# Patient Record
Sex: MALE | Race: WHITE | NOT HISPANIC OR LATINO | Employment: FULL TIME | URBAN - METROPOLITAN AREA
[De-identification: names, ages, dates, MRNs, and addresses within clinical notes are randomized per-mention and may not be internally consistent; named-entity substitution may affect disease eponyms.]

---

## 2017-02-18 DIAGNOSIS — I10 ESSENTIAL HYPERTENSION, BENIGN: Chronic | ICD-10-CM

## 2017-02-18 RX ORDER — METOPROLOL TARTRATE 25 MG/1
TABLET, FILM COATED ORAL
Qty: 30 TABLET | Refills: 2 | Status: SHIPPED | OUTPATIENT
Start: 2017-02-18 | End: 2017-05-29 | Stop reason: SDUPTHER

## 2017-04-30 ENCOUNTER — HOSPITAL ENCOUNTER (EMERGENCY)
Facility: HOSPITAL | Age: 62
Discharge: HOME | End: 2017-05-01
Attending: EMERGENCY MEDICINE | Admitting: EMERGENCY MEDICINE
Payer: COMMERCIAL

## 2017-04-30 VITALS
RESPIRATION RATE: 18 BRPM | HEIGHT: 69 IN | HEART RATE: 79 BPM | TEMPERATURE: 98 F | BODY MASS INDEX: 32.58 KG/M2 | WEIGHT: 220 LBS | DIASTOLIC BLOOD PRESSURE: 69 MMHG | OXYGEN SATURATION: 97 % | SYSTOLIC BLOOD PRESSURE: 131 MMHG

## 2017-04-30 DIAGNOSIS — S90.31XA CONTUSION OF HEEL, RIGHT, INITIAL ENCOUNTER: Primary | ICD-10-CM

## 2017-04-30 DIAGNOSIS — R52 PAIN: ICD-10-CM

## 2017-04-30 PROCEDURE — 99283 EMERGENCY DEPT VISIT LOW MDM: CPT

## 2017-04-30 PROCEDURE — 99283 EMERGENCY DEPT VISIT LOW MDM: CPT | Mod: ,,, | Performed by: EMERGENCY MEDICINE

## 2017-05-01 NOTE — DISCHARGE INSTRUCTIONS
Follow up with your doctor in 1-2 weeks.  Take tylenol if needed for pain  Return to ED for worsening pain or any other concerns.      Foot Contusion  You have a contusion. This is also called a bruise. There is swelling and some bleeding under the skin, but no broken bones. This injury generally takes a few days to a few weeks to heal.  During that time, the bruise will typically change in color from reddish, to purple-blue, to greenish-yellow, then to yellow-brown.  Home care  · Elevate the foot to reduce pain and swelling. As much as possible, sit or lie down with the foot raised about the level of your heart. This is especially important during the first 48 hours.  · Ice the foot to help reduce pain and swelling. Wrap a cold source (ice pack or ice cubes in a plastic bag) in a thin towel. Apply to the bruised area for 20 minutes every 1 to 2 hours the first day. Continue this 3 to 4 times a day until the pain and swelling goes away.  · Unless another medication was prescribed, you can take acetaminophen, ibuprofen, or naproxen to control pain. (If you have chronic liver or kidney disease or ever had a stomach ulcer or GI bleeding, talk with your doctor before using these medicines.)  Follow up  Follow up with your health care provider or our staff as advised. Call if you are not improving within 1 to 2 weeks.  When to seek medical advice   Call your health care provider right away if you have any of the following:  · Increased pain or swelling  · Foot or leg becomes cold, blue, numb or tingly  · Signs of infection: Warmth, drainage, or increased redness or pain around the bruise  · Inability to move the injured foot   · Frequent bruising for unknown reasons  Date Last Reviewed: 4/29/2015  © 5442-1573 [employer]. [address] [place] [address], [place] [address]. All rights reserved. This information is not intended as a substitute for professional medical care. Always follow your healthcare professional's  instructions.

## 2017-05-01 NOTE — ED PROVIDER NOTES
"Encounter Date: 4/30/2017       History     Chief Complaint   Patient presents with    Foot Injury     Right foot pain. Pt states he tripped and "twisted my heel."      Review of patient's allergies indicates:  No Known Allergies  HPI Comments: Pt seen at 11:50 PM    63 y/o M states he mistepped and tripped and landed on his rt foot. States he thinks he twisted his heel but cannot provide more details on how he landed on hid foot.  Did not fall to ground. No head trauma. No LOC.  No other complaint of pain.  The history is provided by the patient.     Past Medical History:   Diagnosis Date    Back pain     Hyperglycemia     Hyperlipidemia     Hypertension     Myocardial infarction     Nuclear sclerosis - Both Eyes 1/10/2013    Polyp of colon     Tennis elbow 8/6/2014     Past Surgical History:   Procedure Laterality Date    CATARACT EXTRACTION Left 6/26/2015    Dr. Larsen    CORONARY ANGIOPLASTY WITH STENT PLACEMENT       Family History   Problem Relation Age of Onset    Cataracts Sister     Amblyopia Neg Hx     Blindness Neg Hx     Glaucoma Neg Hx     Macular degeneration Neg Hx     Retinal detachment Neg Hx     Strabismus Neg Hx      Social History   Substance Use Topics    Smoking status: Never Smoker    Smokeless tobacco: None    Alcohol use No     Review of Systems   Constitutional: Negative for fever.   Eyes: Negative for visual disturbance.   Respiratory: Negative for chest tightness.    Cardiovascular: Negative for chest pain.   Gastrointestinal: Negative for abdominal pain.   Musculoskeletal: Negative for back pain and neck pain.   Skin: Negative for rash.   Neurological: Negative for dizziness and headaches.       Physical Exam   Initial Vitals   BP Pulse Resp Temp SpO2   04/30/17 2204 04/30/17 2204 04/30/17 2204 04/30/17 2204 04/30/17 2204   131/69 79 18 98.1 °F (36.7 °C) 97 %     Physical Exam    Nursing note and vitals reviewed.  Constitutional: He appears well-developed and " well-nourished.   HENT:   Head: Normocephalic and atraumatic.   Neck: Normal range of motion. Neck supple.   Musculoskeletal: Normal range of motion. He exhibits no edema.   Rt ankle no ttp media land lateral malleolus, no ttp over achilles tendon.  No swelling or ecchymosis  Rt heel with ttp, no ecchymosis or swelling  No ttp over plantar fascia   Neurological: He is alert and oriented to person, place, and time.   Skin: Skin is warm and dry.         ED Course   Procedures  Labs Reviewed - No data to display       X-Rays:   Independently Interpreted Readings:   Other Readings:  XR Rt ankle no acute fracture    Medical Decision Making:   Initial Assessment:   61 y/o M mechanical fall with trauma to Rt heel  No suspision of achilles tendon injury  No suspision of calcaneus fracture  XR to rule out ankle fracture eval calcaneus  Anticipate discharge home              Attending Attestation:             Attending ED Notes:   XR negative for acute fracture  Will discharge home with RICE instructions and follow up with PCP          ED Course     Clinical Impression:   The primary encounter diagnosis was Contusion of heel, right, initial encounter. A diagnosis of Pain was also pertinent to this visit.    Disposition:   Disposition: Discharged  Condition: Stable       Vangie Whitley MD  05/04/17 0731

## 2017-05-29 DIAGNOSIS — I10 ESSENTIAL HYPERTENSION, BENIGN: Chronic | ICD-10-CM

## 2017-05-29 RX ORDER — METOPROLOL TARTRATE 25 MG/1
TABLET, FILM COATED ORAL
Qty: 30 TABLET | Refills: 2 | Status: SHIPPED | OUTPATIENT
Start: 2017-05-29 | End: 2017-08-22 | Stop reason: SDUPTHER

## 2017-06-21 ENCOUNTER — OFFICE VISIT (OUTPATIENT)
Dept: FAMILY MEDICINE | Facility: CLINIC | Age: 62
End: 2017-06-21
Payer: COMMERCIAL

## 2017-06-21 ENCOUNTER — LAB VISIT (OUTPATIENT)
Dept: LAB | Facility: HOSPITAL | Age: 62
End: 2017-06-21
Attending: FAMILY MEDICINE
Payer: COMMERCIAL

## 2017-06-21 VITALS
HEIGHT: 69 IN | HEART RATE: 58 BPM | TEMPERATURE: 98 F | BODY MASS INDEX: 32.75 KG/M2 | OXYGEN SATURATION: 97 % | WEIGHT: 221.13 LBS | SYSTOLIC BLOOD PRESSURE: 118 MMHG | DIASTOLIC BLOOD PRESSURE: 76 MMHG

## 2017-06-21 DIAGNOSIS — I20.89 STABLE ANGINA: ICD-10-CM

## 2017-06-21 DIAGNOSIS — E78.5 HYPERLIPIDEMIA, UNSPECIFIED HYPERLIPIDEMIA TYPE: Chronic | ICD-10-CM

## 2017-06-21 DIAGNOSIS — N18.30 HYPERTENSIVE KIDNEY DISEASE WITH CHRONIC KIDNEY DISEASE STAGE III: Primary | ICD-10-CM

## 2017-06-21 DIAGNOSIS — I12.9 HYPERTENSIVE KIDNEY DISEASE WITH CHRONIC KIDNEY DISEASE STAGE III: Primary | ICD-10-CM

## 2017-06-21 DIAGNOSIS — I25.119 CORONARY ARTERY DISEASE INVOLVING NATIVE CORONARY ARTERY WITH ANGINA PECTORIS, UNSPECIFIED WHETHER NATIVE OR TRANSPLANTED HEART: Chronic | ICD-10-CM

## 2017-06-21 DIAGNOSIS — I25.2 OLD MI (MYOCARDIAL INFARCTION): ICD-10-CM

## 2017-06-21 DIAGNOSIS — Z23 NEED FOR DIPHTHERIA-TETANUS-PERTUSSIS (TDAP) VACCINE: ICD-10-CM

## 2017-06-21 LAB
ALBUMIN SERPL BCP-MCNC: 4.1 G/DL
ALP SERPL-CCNC: 74 U/L
ALT SERPL W/O P-5'-P-CCNC: 20 U/L
ANION GAP SERPL CALC-SCNC: 6 MMOL/L
AST SERPL-CCNC: 21 U/L
BILIRUB SERPL-MCNC: 1 MG/DL
BUN SERPL-MCNC: 27 MG/DL
CALCIUM SERPL-MCNC: 9.9 MG/DL
CHLORIDE SERPL-SCNC: 109 MMOL/L
CHOLEST/HDLC SERPL: 2.9 {RATIO}
CO2 SERPL-SCNC: 25 MMOL/L
CREAT SERPL-MCNC: 1.5 MG/DL
EST. GFR  (AFRICAN AMERICAN): 56.1 ML/MIN/1.73 M^2
EST. GFR  (NON AFRICAN AMERICAN): 48.5 ML/MIN/1.73 M^2
GLUCOSE SERPL-MCNC: 91 MG/DL
HDL/CHOLESTEROL RATIO: 33.9 %
HDLC SERPL-MCNC: 109 MG/DL
HDLC SERPL-MCNC: 37 MG/DL
LDLC SERPL CALC-MCNC: 59.2 MG/DL
NONHDLC SERPL-MCNC: 72 MG/DL
POTASSIUM SERPL-SCNC: 5.2 MMOL/L
PROT SERPL-MCNC: 7.6 G/DL
SODIUM SERPL-SCNC: 140 MMOL/L
TRIGL SERPL-MCNC: 64 MG/DL

## 2017-06-21 PROCEDURE — 80061 LIPID PANEL: CPT

## 2017-06-21 PROCEDURE — 80053 COMPREHEN METABOLIC PANEL: CPT

## 2017-06-21 PROCEDURE — 99214 OFFICE O/P EST MOD 30 MIN: CPT | Mod: S$GLB,,, | Performed by: FAMILY MEDICINE

## 2017-06-21 PROCEDURE — 99999 PR PBB SHADOW E&M-EST. PATIENT-LVL III: CPT | Mod: PBBFAC,,, | Performed by: FAMILY MEDICINE

## 2017-06-21 PROCEDURE — 36415 COLL VENOUS BLD VENIPUNCTURE: CPT | Mod: PO

## 2017-06-21 NOTE — PROGRESS NOTES
Subjective:       Patient ID: Pratik Laird is a 62 y.o. male.    Chief Complaint: Hypertension (6 mo f/u); Hyperlipidemia; and Chronic Kidney Disease  63 yo male with HTN, hyperlipidemia and CAD presents for f/u of his chronic conditions. Pt is followed by Dr. Mace for CAD.  Hypertension   This is a chronic problem. The current episode started more than 1 year ago. The problem is unchanged. The problem is controlled. Pertinent negatives include no chest pain, orthopnea, palpitations, peripheral edema, PND or shortness of breath. Past treatments include beta blockers and ACE inhibitors. The current treatment provides significant improvement. There are no compliance problems.  Hypertensive end-organ damage includes CAD/MI. Identifiable causes of hypertension include chronic renal disease.   Hyperlipidemia   This is a chronic problem. The current episode started more than 1 year ago. The problem is controlled. Recent lipid tests were reviewed and are normal. Exacerbating diseases include chronic renal disease and obesity. He has no history of diabetes, hypothyroidism, liver disease or nephrotic syndrome. Pertinent negatives include no chest pain, focal sensory loss, focal weakness, leg pain, myalgias or shortness of breath. Current antihyperlipidemic treatment includes statins.     Review of Systems   Constitutional: Negative for chills and fever.   Eyes: Negative for visual disturbance.   Respiratory: Negative for shortness of breath.    Cardiovascular: Negative for chest pain, palpitations, orthopnea, leg swelling and PND.   Gastrointestinal: Negative for abdominal pain, anal bleeding, blood in stool, constipation, diarrhea, nausea and vomiting.   Genitourinary: Negative for dysuria and hematuria.   Musculoskeletal: Negative for myalgias.   Neurological: Negative for focal weakness.   Psychiatric/Behavioral: Negative for dysphoric mood and sleep disturbance. The patient is not nervous/anxious.         Objective:      Physical Exam   Constitutional: He is oriented to person, place, and time. He appears well-developed and well-nourished.   HENT:   Head: Normocephalic and atraumatic.   Neck: Normal range of motion. Neck supple. No JVD present. No thyromegaly present.   Cardiovascular: Normal rate, regular rhythm, normal heart sounds and intact distal pulses.    Pulmonary/Chest: Effort normal and breath sounds normal. He has no wheezes. He has no rales.   Abdominal: Soft. Bowel sounds are normal. He exhibits no mass. There is no tenderness.   Lymphadenopathy:     He has no cervical adenopathy.   Neurological: He is alert and oriented to person, place, and time.   Skin: Skin is warm and dry.   Vitals reviewed.      Assessment:     See plan      Plan:       Pratik was seen today for hypertension, hyperlipidemia and chronic kidney disease.    Diagnoses and all orders for this visit:    Hypertensive kidney disease with chronic kidney disease stage III: Controlled    Hyperlipidemia, unspecified hyperlipidemia type: Controlled  -     Comprehensive metabolic panel; Future  -     Lipid panel; Future    Coronary artery disease involving native coronary artery with angina pectoris, unspecified whether native or transplanted heart: Stable  - F/U with Dr. Mace    Stable angina    Old MI (myocardial infarction)    BMI 32.0-32.9,adult  Weight loss advised. Dietary and exercise counseling done.    Need for diphtheria-tetanus-pertussis (Tdap) vaccine  -     (In Office Administered) Tdap Vaccine (deferred)    F/U in 6 months.

## 2017-06-28 ENCOUNTER — CLINICAL SUPPORT (OUTPATIENT)
Dept: FAMILY MEDICINE | Facility: CLINIC | Age: 62
End: 2017-06-28
Payer: COMMERCIAL

## 2017-06-28 PROCEDURE — 90715 TDAP VACCINE 7 YRS/> IM: CPT | Mod: S$GLB,,, | Performed by: INTERNAL MEDICINE

## 2017-06-28 PROCEDURE — 90471 IMMUNIZATION ADMIN: CPT | Mod: S$GLB,,, | Performed by: INTERNAL MEDICINE

## 2017-06-30 ENCOUNTER — TELEPHONE (OUTPATIENT)
Dept: FAMILY MEDICINE | Facility: CLINIC | Age: 62
End: 2017-06-30

## 2017-06-30 DIAGNOSIS — I12.9 HYPERTENSIVE KIDNEY DISEASE WITH CHRONIC KIDNEY DISEASE STAGE III: Primary | ICD-10-CM

## 2017-06-30 DIAGNOSIS — N18.30 HYPERTENSIVE KIDNEY DISEASE WITH CHRONIC KIDNEY DISEASE STAGE III: Primary | ICD-10-CM

## 2017-06-30 NOTE — TELEPHONE ENCOUNTER
Please call pt regarding labs done 6/21/17. Kidney function has decreased. Please advise pt to increase his water intake and recheck kidney function in 1 week.    Cholesterol is controlled.

## 2017-07-03 NOTE — TELEPHONE ENCOUNTER
Spoke with patient about lab result, patient will be here on 07/10/17 to re-draw lab.  Patient verbalized understanding.

## 2017-07-10 ENCOUNTER — LAB VISIT (OUTPATIENT)
Dept: LAB | Facility: HOSPITAL | Age: 62
End: 2017-07-10
Attending: FAMILY MEDICINE
Payer: COMMERCIAL

## 2017-07-10 DIAGNOSIS — I12.9 HYPERTENSIVE KIDNEY DISEASE WITH CHRONIC KIDNEY DISEASE STAGE III: ICD-10-CM

## 2017-07-10 DIAGNOSIS — N18.30 HYPERTENSIVE KIDNEY DISEASE WITH CHRONIC KIDNEY DISEASE STAGE III: ICD-10-CM

## 2017-07-10 LAB
ALBUMIN SERPL BCP-MCNC: 3.9 G/DL
ANION GAP SERPL CALC-SCNC: 8 MMOL/L
BUN SERPL-MCNC: 20 MG/DL
CALCIUM SERPL-MCNC: 9.5 MG/DL
CHLORIDE SERPL-SCNC: 106 MMOL/L
CO2 SERPL-SCNC: 25 MMOL/L
CREAT SERPL-MCNC: 1.5 MG/DL
EST. GFR  (AFRICAN AMERICAN): 56.1 ML/MIN/1.73 M^2
EST. GFR  (NON AFRICAN AMERICAN): 48.5 ML/MIN/1.73 M^2
GLUCOSE SERPL-MCNC: 78 MG/DL
PHOSPHATE SERPL-MCNC: 3.2 MG/DL
POTASSIUM SERPL-SCNC: 4.8 MMOL/L
SODIUM SERPL-SCNC: 139 MMOL/L

## 2017-07-10 PROCEDURE — 80069 RENAL FUNCTION PANEL: CPT

## 2017-07-10 PROCEDURE — 36415 COLL VENOUS BLD VENIPUNCTURE: CPT | Mod: PO

## 2017-07-16 ENCOUNTER — TELEPHONE (OUTPATIENT)
Dept: FAMILY MEDICINE | Facility: CLINIC | Age: 62
End: 2017-07-16

## 2017-07-16 DIAGNOSIS — N18.30 HYPERTENSIVE KIDNEY DISEASE WITH CHRONIC KIDNEY DISEASE STAGE III: Primary | ICD-10-CM

## 2017-07-16 DIAGNOSIS — I12.9 HYPERTENSIVE KIDNEY DISEASE WITH CHRONIC KIDNEY DISEASE STAGE III: Primary | ICD-10-CM

## 2017-07-16 NOTE — TELEPHONE ENCOUNTER
Spoke with patient and scheduled appt with Nephrology on [address]. Patient request to mail appt.  Patient voices understanding.

## 2017-08-21 ENCOUNTER — OFFICE VISIT (OUTPATIENT)
Dept: NEPHROLOGY | Facility: CLINIC | Age: 62
End: 2017-08-21
Payer: COMMERCIAL

## 2017-08-21 VITALS
HEART RATE: 60 BPM | WEIGHT: 222.69 LBS | DIASTOLIC BLOOD PRESSURE: 60 MMHG | BODY MASS INDEX: 32.98 KG/M2 | HEIGHT: 69 IN | OXYGEN SATURATION: 98 % | SYSTOLIC BLOOD PRESSURE: 120 MMHG

## 2017-08-21 DIAGNOSIS — I25.10 CORONARY ARTERY DISEASE, ANGINA PRESENCE UNSPECIFIED, UNSPECIFIED VESSEL OR LESION TYPE, UNSPECIFIED WHETHER NATIVE OR TRANSPLANTED HEART: ICD-10-CM

## 2017-08-21 DIAGNOSIS — I10 ESSENTIAL HYPERTENSION: ICD-10-CM

## 2017-08-21 DIAGNOSIS — N18.30 CHRONIC KIDNEY DISEASE (CKD), STAGE III (MODERATE): Primary | ICD-10-CM

## 2017-08-21 PROCEDURE — 3078F DIAST BP <80 MM HG: CPT | Mod: S$GLB,,, | Performed by: INTERNAL MEDICINE

## 2017-08-21 PROCEDURE — 99203 OFFICE O/P NEW LOW 30 MIN: CPT | Mod: S$GLB,,, | Performed by: INTERNAL MEDICINE

## 2017-08-21 PROCEDURE — 3008F BODY MASS INDEX DOCD: CPT | Mod: S$GLB,,, | Performed by: INTERNAL MEDICINE

## 2017-08-21 PROCEDURE — 99999 PR PBB SHADOW E&M-EST. PATIENT-LVL III: CPT | Mod: PBBFAC,,, | Performed by: INTERNAL MEDICINE

## 2017-08-21 PROCEDURE — 3074F SYST BP LT 130 MM HG: CPT | Mod: S$GLB,,, | Performed by: INTERNAL MEDICINE

## 2017-08-21 NOTE — PROGRESS NOTES
Patient is here today for evaluation of CKD III  Baseline Cr 1.2-1.5 mg/dl His most recent lab (7/10/17)  Cr 1.5 mg/dl; eGFR49 ml/min; potassium 4.5 mmol/L. He does not have clinically significant proteinuria. There is a hx of hypertension and CAD (s/p MI; stable angina) Denies gross hematuria; dysuria; kidney stones; NSAIDs; diabetes. No recent imaging studies with radiocontrast. Today he has no new complaints.     ROS;   Obese gentleman; no acute distress; oriented x 3  HEENT; Grossly intact  CHEST; Clear P&A;no rales or rhonchi  HEART; RR; S1&S2 no murmur rub gallop  ABD; BS(+); non-tender; no organomegaly  EXT; (-)Edema    Impression  CKD III Stable;   Repeat lab today    Hypertension. Satisfactory control    Plan  Return Visit; 6 mo; pending agove

## 2017-08-21 NOTE — LETTER
August 21, 2017      Karen Winn MD  [address] [facility]  [place] [address]           [facility]  [address]  [facility] [address]  Phone: [phone]  Fax: [phone]          Patient: Pratik Laird   MR Number: [id number]   YOB: 1955   Date of Visit: 8/21/2017       Dear Dr. Karen Winn:    Thank you for referring Pratik Laird to me for evaluation. Attached you will find relevant portions of my assessment and plan of care.    If you have questions, please do not hesitate to call me. I look forward to following Pratik Laird along with you.    Sincerely,    Escobar De Paz MD    Enclosure  CC:  No Recipients    If you would like to receive this communication electronically, please contact [email or web address] or [phone] to request more information on [employer] access.    For providers and/or their staff who would like to refer a patient to [facility], please contact us through our one-stop-shop provider referral line, [facility], at [phone].    If you feel you have received this communication in error or would no longer like to receive these types of communications, please e-mail [email or web address]

## 2017-08-22 DIAGNOSIS — I10 ESSENTIAL HYPERTENSION, BENIGN: Chronic | ICD-10-CM

## 2017-08-22 RX ORDER — METOPROLOL TARTRATE 25 MG/1
TABLET, FILM COATED ORAL
Qty: 30 TABLET | Refills: 2 | Status: SHIPPED | OUTPATIENT
Start: 2017-08-22 | End: 2017-09-28 | Stop reason: SDUPTHER

## 2017-09-27 DIAGNOSIS — I10 ESSENTIAL HYPERTENSION, BENIGN: Chronic | ICD-10-CM

## 2017-09-28 RX ORDER — METOPROLOL TARTRATE 25 MG/1
TABLET, FILM COATED ORAL
Qty: 30 TABLET | Refills: 2 | Status: SHIPPED | OUTPATIENT
Start: 2017-09-28 | End: 2017-12-21 | Stop reason: SDUPTHER

## 2017-12-21 DIAGNOSIS — I10 ESSENTIAL HYPERTENSION, BENIGN: Chronic | ICD-10-CM

## 2017-12-21 RX ORDER — METOPROLOL TARTRATE 25 MG/1
TABLET, FILM COATED ORAL
Qty: 30 TABLET | Refills: 2 | Status: SHIPPED | OUTPATIENT
Start: 2017-12-21 | End: 2018-03-25 | Stop reason: SDUPTHER

## 2017-12-27 ENCOUNTER — LAB VISIT (OUTPATIENT)
Dept: LAB | Facility: HOSPITAL | Age: 62
End: 2017-12-27
Attending: FAMILY MEDICINE
Payer: COMMERCIAL

## 2017-12-27 ENCOUNTER — OFFICE VISIT (OUTPATIENT)
Dept: FAMILY MEDICINE | Facility: CLINIC | Age: 62
End: 2017-12-27
Payer: COMMERCIAL

## 2017-12-27 VITALS
SYSTOLIC BLOOD PRESSURE: 118 MMHG | HEART RATE: 56 BPM | OXYGEN SATURATION: 97 % | WEIGHT: 225.06 LBS | HEIGHT: 69 IN | BODY MASS INDEX: 33.34 KG/M2 | DIASTOLIC BLOOD PRESSURE: 72 MMHG

## 2017-12-27 DIAGNOSIS — I25.2 OLD MI (MYOCARDIAL INFARCTION): ICD-10-CM

## 2017-12-27 DIAGNOSIS — Z23 NEED FOR INFLUENZA VACCINATION: ICD-10-CM

## 2017-12-27 DIAGNOSIS — E78.5 HYPERLIPIDEMIA, UNSPECIFIED HYPERLIPIDEMIA TYPE: Chronic | ICD-10-CM

## 2017-12-27 DIAGNOSIS — D64.9 ANEMIA, UNSPECIFIED TYPE: ICD-10-CM

## 2017-12-27 DIAGNOSIS — N18.30 HYPERTENSIVE KIDNEY DISEASE WITH CHRONIC KIDNEY DISEASE STAGE III: Primary | ICD-10-CM

## 2017-12-27 DIAGNOSIS — I20.89 STABLE ANGINA: ICD-10-CM

## 2017-12-27 DIAGNOSIS — I12.9 HYPERTENSIVE KIDNEY DISEASE WITH CHRONIC KIDNEY DISEASE STAGE III: Primary | ICD-10-CM

## 2017-12-27 DIAGNOSIS — I25.119 CORONARY ARTERY DISEASE INVOLVING NATIVE CORONARY ARTERY WITH ANGINA PECTORIS, UNSPECIFIED WHETHER NATIVE OR TRANSPLANTED HEART: Chronic | ICD-10-CM

## 2017-12-27 LAB
ALBUMIN SERPL BCP-MCNC: 3.7 G/DL
ALP SERPL-CCNC: 69 U/L
ALT SERPL W/O P-5'-P-CCNC: 19 U/L
ANION GAP SERPL CALC-SCNC: 6 MMOL/L
AST SERPL-CCNC: 20 U/L
BASOPHILS # BLD AUTO: 0.05 K/UL
BASOPHILS NFR BLD: 0.7 %
BILIRUB SERPL-MCNC: 1.2 MG/DL
BUN SERPL-MCNC: 18 MG/DL
CALCIUM SERPL-MCNC: 9.4 MG/DL
CHLORIDE SERPL-SCNC: 107 MMOL/L
CHOLEST SERPL-MCNC: 102 MG/DL
CHOLEST/HDLC SERPL: 3.3 {RATIO}
CO2 SERPL-SCNC: 29 MMOL/L
CREAT SERPL-MCNC: 1.3 MG/DL
DIFFERENTIAL METHOD: ABNORMAL
EOSINOPHIL # BLD AUTO: 0.2 K/UL
EOSINOPHIL NFR BLD: 2.4 %
ERYTHROCYTE [DISTWIDTH] IN BLOOD BY AUTOMATED COUNT: 12 %
EST. GFR  (AFRICAN AMERICAN): >60 ML/MIN/1.73 M^2
EST. GFR  (NON AFRICAN AMERICAN): 57.7 ML/MIN/1.73 M^2
GLUCOSE SERPL-MCNC: 94 MG/DL
HCT VFR BLD AUTO: 40.1 %
HDLC SERPL-MCNC: 31 MG/DL
HDLC SERPL: 30.4 %
HGB BLD-MCNC: 13.7 G/DL
IMM GRANULOCYTES # BLD AUTO: 0.02 K/UL
IMM GRANULOCYTES NFR BLD AUTO: 0.3 %
LDLC SERPL CALC-MCNC: 56 MG/DL
LYMPHOCYTES # BLD AUTO: 2.2 K/UL
LYMPHOCYTES NFR BLD: 31.8 %
MCH RBC QN AUTO: 34.2 PG
MCHC RBC AUTO-ENTMCNC: 34.2 G/DL
MCV RBC AUTO: 100 FL
MONOCYTES # BLD AUTO: 0.6 K/UL
MONOCYTES NFR BLD: 9.1 %
NEUTROPHILS # BLD AUTO: 3.9 K/UL
NEUTROPHILS NFR BLD: 55.7 %
NONHDLC SERPL-MCNC: 71 MG/DL
NRBC BLD-RTO: 0 /100 WBC
PLATELET # BLD AUTO: 188 K/UL
PMV BLD AUTO: 11.1 FL
POTASSIUM SERPL-SCNC: 4.8 MMOL/L
PROT SERPL-MCNC: 7.4 G/DL
RBC # BLD AUTO: 4.01 M/UL
SODIUM SERPL-SCNC: 142 MMOL/L
TRIGL SERPL-MCNC: 75 MG/DL
WBC # BLD AUTO: 6.95 K/UL

## 2017-12-27 PROCEDURE — 85025 COMPLETE CBC W/AUTO DIFF WBC: CPT

## 2017-12-27 PROCEDURE — 80053 COMPREHEN METABOLIC PANEL: CPT

## 2017-12-27 PROCEDURE — 99999 PR PBB SHADOW E&M-EST. PATIENT-LVL III: CPT | Mod: PBBFAC,,, | Performed by: FAMILY MEDICINE

## 2017-12-27 PROCEDURE — 90471 IMMUNIZATION ADMIN: CPT | Mod: S$GLB,,, | Performed by: FAMILY MEDICINE

## 2017-12-27 PROCEDURE — 80061 LIPID PANEL: CPT

## 2017-12-27 PROCEDURE — 90688 IIV4 VACCINE SPLT 0.5 ML IM: CPT | Mod: S$GLB,,, | Performed by: FAMILY MEDICINE

## 2017-12-27 PROCEDURE — 99214 OFFICE O/P EST MOD 30 MIN: CPT | Mod: 25,S$GLB,, | Performed by: FAMILY MEDICINE

## 2017-12-27 PROCEDURE — 36415 COLL VENOUS BLD VENIPUNCTURE: CPT | Mod: PO

## 2017-12-27 NOTE — PROGRESS NOTES
Subjective:       Patient ID: Pratik Laird is a 62 y.o. male.    Chief Complaint: Follow-up (6 mos); Hypertension; Hyperlipidemia; and Chronic Kidney Disease    Hypertension   This is a chronic problem. The current episode started more than 1 year ago. The problem is unchanged. The problem is controlled. Pertinent negatives include no blurred vision, chest pain, headaches, orthopnea, palpitations, peripheral edema, PND or shortness of breath. Past treatments include ACE inhibitors and beta blockers. The current treatment provides significant improvement. There are no compliance problems.  Hypertensive end-organ damage includes kidney disease and CAD/MI. Identifiable causes of hypertension include chronic renal disease.   Hyperlipidemia   This is a chronic problem. The current episode started more than 1 year ago. The problem is controlled. Recent lipid tests were reviewed and are normal. Exacerbating diseases include chronic renal disease and obesity. He has no history of diabetes, hypothyroidism, liver disease or nephrotic syndrome. Pertinent negatives include no chest pain, focal sensory loss, focal weakness, leg pain, myalgias or shortness of breath. Current antihyperlipidemic treatment includes statins. The current treatment provides significant improvement of lipids. There are no compliance problems.    Pt is followed by Dr. Mace for CAD. Last visit October 2017. Is seen annually. Is on Nitro prn angina. Nitro last taken 1 year ago.  No regular exercise. Walks at work. Is averaging over 10,00 steps per day.  To retire in 18 months.  Review of Systems   Constitutional: Negative for chills and fever.   Eyes: Negative for blurred vision and visual disturbance.   Respiratory: Negative for shortness of breath.    Cardiovascular: Negative for chest pain, palpitations, orthopnea, leg swelling and PND.   Gastrointestinal: Negative for abdominal pain, anal bleeding, blood in stool, constipation, diarrhea, nausea  and vomiting.   Genitourinary: Negative for dysuria and hematuria.   Musculoskeletal: Negative for myalgias.   Neurological: Negative for focal weakness and headaches.       Objective:      Physical Exam   Constitutional: He is oriented to person, place, and time. He appears well-developed and well-nourished.   HENT:   Head: Normocephalic and atraumatic.   Neck: Normal range of motion. Neck supple. No JVD present. No thyromegaly present.   Cardiovascular: Normal rate, regular rhythm and normal heart sounds.    Pulmonary/Chest: Effort normal and breath sounds normal. He has no wheezes. He has no rales.   Abdominal: Soft. Bowel sounds are normal. He exhibits no mass. There is no tenderness.   Lymphadenopathy:     He has no cervical adenopathy.   Neurological: He is alert and oriented to person, place, and time.   Skin: Skin is warm and dry.   Vitals reviewed.      Assessment:         See plan  Plan:       Pratik was seen today for follow-up, hypertension, hyperlipidemia and chronic kidney disease.    Diagnoses and all orders for this visit:    Hypertensive kidney disease with chronic kidney disease stage III: Stable    Hyperlipidemia, unspecified hyperlipidemia type: Stable  -     Lipid panel; Future  -     Comprehensive metabolic panel; Future    Coronary artery disease involving native coronary artery with angina pectoris, unspecified whether native or transplanted heart: Stable  - F/U with Dr. Mace October 2018    Stable angina    Old MI (myocardial infarction)    Anemia, unspecified type  -     CBC auto differential; Future    BMI 33.0-33.9,adult  Weight loss advised. Dietary and exercise counseling done.    Need for influenza vaccination  -     Influenza - Quadrivalent (3 years & older)    F/U in 6 months.

## 2018-03-25 DIAGNOSIS — I10 ESSENTIAL HYPERTENSION, BENIGN: Chronic | ICD-10-CM

## 2018-03-25 RX ORDER — METOPROLOL TARTRATE 25 MG/1
TABLET, FILM COATED ORAL
Qty: 30 TABLET | Refills: 2 | Status: SHIPPED | OUTPATIENT
Start: 2018-03-25 | End: 2020-09-24 | Stop reason: SDUPTHER

## 2021-02-22 PROBLEM — H69.93 DYSFUNCTION OF BOTH EUSTACHIAN TUBES: Status: ACTIVE | Noted: 2019-03-11

## 2021-02-22 PROBLEM — Z95.5 STENTED CORONARY ARTERY: Status: ACTIVE | Noted: 2018-06-11

## 2021-02-25 ENCOUNTER — HOSPITAL ENCOUNTER (OUTPATIENT)
Dept: RADIOLOGY | Facility: HOSPITAL | Age: 66
Discharge: HOME | End: 2021-02-25
Attending: INTERNAL MEDICINE
Payer: MEDICARE

## 2021-02-25 DIAGNOSIS — G89.29 CHRONIC RIGHT SHOULDER PAIN: ICD-10-CM

## 2021-02-25 DIAGNOSIS — M25.511 CHRONIC RIGHT SHOULDER PAIN: ICD-10-CM

## 2021-02-25 PROBLEM — H26.9 CATARACT: Status: ACTIVE | Noted: 2021-02-25

## 2021-02-25 PROCEDURE — 73030 X-RAY EXAM OF SHOULDER: CPT | Mod: TC,FY,RT

## 2021-02-25 PROCEDURE — 73030 XR SHOULDER COMPLETE 2 OR MORE VIEWS RIGHT: ICD-10-PCS | Mod: 26,RT,, | Performed by: RADIOLOGY

## 2021-02-25 PROCEDURE — 73030 X-RAY EXAM OF SHOULDER: CPT | Mod: 26,RT,, | Performed by: RADIOLOGY

## 2021-05-05 ENCOUNTER — LAB VISIT (OUTPATIENT)
Dept: LAB | Facility: HOSPITAL | Age: 66
End: 2021-05-05
Attending: INTERNAL MEDICINE
Payer: MEDICARE

## 2021-05-05 DIAGNOSIS — I10 ESSENTIAL HYPERTENSION: ICD-10-CM

## 2021-05-05 DIAGNOSIS — N18.31 STAGE 3A CHRONIC KIDNEY DISEASE: ICD-10-CM

## 2021-05-05 LAB
ALBUMIN SERPL BCP-MCNC: 3.9 G/DL (ref 3.5–5.2)
ALP SERPL-CCNC: 68 U/L (ref 55–135)
ALT SERPL W/O P-5'-P-CCNC: 21 U/L (ref 10–44)
ANION GAP SERPL CALC-SCNC: 8 MMOL/L (ref 8–16)
AST SERPL-CCNC: 20 U/L (ref 10–40)
BASOPHILS # BLD AUTO: 0.07 K/UL (ref 0–0.2)
BASOPHILS NFR BLD: 1 % (ref 0–1.9)
BILIRUB SERPL-MCNC: 0.8 MG/DL (ref 0.1–1)
BUN SERPL-MCNC: 24 MG/DL (ref 8–23)
CALCIUM SERPL-MCNC: 9.1 MG/DL (ref 8.7–10.5)
CHLORIDE SERPL-SCNC: 108 MMOL/L (ref 95–110)
CO2 SERPL-SCNC: 23 MMOL/L (ref 23–29)
CREAT SERPL-MCNC: 1.3 MG/DL (ref 0.5–1.4)
DIFFERENTIAL METHOD: ABNORMAL
EOSINOPHIL # BLD AUTO: 0.2 K/UL (ref 0–0.5)
EOSINOPHIL NFR BLD: 2.7 % (ref 0–8)
ERYTHROCYTE [DISTWIDTH] IN BLOOD BY AUTOMATED COUNT: 12.3 % (ref 11.5–14.5)
EST. GFR  (AFRICAN AMERICAN): >60 ML/MIN/1.73 M^2
EST. GFR  (NON AFRICAN AMERICAN): 56 ML/MIN/1.73 M^2
GLUCOSE SERPL-MCNC: 104 MG/DL (ref 70–110)
HCT VFR BLD AUTO: 40 % (ref 40–54)
HGB BLD-MCNC: 13.7 G/DL (ref 14–18)
IMM GRANULOCYTES # BLD AUTO: 0.01 K/UL (ref 0–0.04)
IMM GRANULOCYTES NFR BLD AUTO: 0.1 % (ref 0–0.5)
LYMPHOCYTES # BLD AUTO: 2.1 K/UL (ref 1–4.8)
LYMPHOCYTES NFR BLD: 30.9 % (ref 18–48)
MCH RBC QN AUTO: 34.9 PG (ref 27–31)
MCHC RBC AUTO-ENTMCNC: 34.3 G/DL (ref 32–36)
MCV RBC AUTO: 102 FL (ref 82–98)
MONOCYTES # BLD AUTO: 0.8 K/UL (ref 0.3–1)
MONOCYTES NFR BLD: 11.8 % (ref 4–15)
NEUTROPHILS # BLD AUTO: 3.6 K/UL (ref 1.8–7.7)
NEUTROPHILS NFR BLD: 53.5 % (ref 38–73)
NRBC BLD-RTO: 0 /100 WBC
PLATELET # BLD AUTO: 164 K/UL (ref 150–450)
PMV BLD AUTO: 11 FL (ref 9.2–12.9)
POTASSIUM SERPL-SCNC: 5.4 MMOL/L (ref 3.5–5.1)
PROT SERPL-MCNC: 7.5 G/DL (ref 6–8.4)
RBC # BLD AUTO: 3.92 M/UL (ref 4.6–6.2)
SODIUM SERPL-SCNC: 139 MMOL/L (ref 136–145)
WBC # BLD AUTO: 6.7 K/UL (ref 3.9–12.7)

## 2021-05-05 PROCEDURE — 36415 COLL VENOUS BLD VENIPUNCTURE: CPT | Performed by: INTERNAL MEDICINE

## 2021-05-05 PROCEDURE — 85025 COMPLETE CBC W/AUTO DIFF WBC: CPT | Performed by: INTERNAL MEDICINE

## 2021-05-05 PROCEDURE — 80053 COMPREHEN METABOLIC PANEL: CPT | Performed by: INTERNAL MEDICINE

## 2021-08-25 ENCOUNTER — CLINICAL SUPPORT (OUTPATIENT)
Dept: URGENT CARE | Facility: CLINIC | Age: 66
End: 2021-08-25
Payer: MEDICARE

## 2021-08-25 DIAGNOSIS — Z11.59 ENCOUNTER FOR SCREENING FOR OTHER VIRAL DISEASES: Primary | ICD-10-CM

## 2021-08-25 LAB
CTP QC/QA: YES
SARS-COV-2 RDRP RESP QL NAA+PROBE: NEGATIVE

## 2021-08-25 PROCEDURE — U0002 COVID-19 LAB TEST NON-CDC: HCPCS | Mod: QW,S$GLB,, | Performed by: INTERNAL MEDICINE

## 2021-08-25 PROCEDURE — 99211 OFF/OP EST MAY X REQ PHY/QHP: CPT | Mod: S$GLB,CS,, | Performed by: INTERNAL MEDICINE

## 2021-08-25 PROCEDURE — 99211 PR OFFICE/OUTPT VISIT, EST, LEVL I: ICD-10-PCS | Mod: S$GLB,CS,, | Performed by: INTERNAL MEDICINE

## 2021-08-25 PROCEDURE — U0002: ICD-10-PCS | Mod: QW,S$GLB,, | Performed by: INTERNAL MEDICINE

## 2021-08-27 ENCOUNTER — OFFICE VISIT (OUTPATIENT)
Dept: OTOLARYNGOLOGY | Facility: CLINIC | Age: 66
End: 2021-08-27
Payer: MEDICARE

## 2021-08-27 VITALS
SYSTOLIC BLOOD PRESSURE: 102 MMHG | WEIGHT: 238.88 LBS | DIASTOLIC BLOOD PRESSURE: 57 MMHG | TEMPERATURE: 98 F | HEART RATE: 67 BPM | BODY MASS INDEX: 35.27 KG/M2

## 2021-08-27 DIAGNOSIS — H61.23 BILATERAL IMPACTED CERUMEN: ICD-10-CM

## 2021-08-27 DIAGNOSIS — R13.10 DYSPHAGIA, UNSPECIFIED TYPE: Primary | ICD-10-CM

## 2021-08-27 DIAGNOSIS — H91.93 BILATERAL HEARING LOSS, UNSPECIFIED HEARING LOSS TYPE: ICD-10-CM

## 2021-08-27 DIAGNOSIS — R09.82 PND (POST-NASAL DRIP): ICD-10-CM

## 2021-08-27 DIAGNOSIS — R05.9 COUGH: ICD-10-CM

## 2021-08-27 DIAGNOSIS — J30.9 ALLERGIC RHINITIS, UNSPECIFIED SEASONALITY, UNSPECIFIED TRIGGER: ICD-10-CM

## 2021-08-27 DIAGNOSIS — K21.9 LARYNGOPHARYNGEAL REFLUX (LPR): ICD-10-CM

## 2021-08-27 DIAGNOSIS — Z97.4 HEARING AID WORN: ICD-10-CM

## 2021-08-27 DIAGNOSIS — J34.2 NASAL SEPTAL DEVIATION: ICD-10-CM

## 2021-08-27 PROCEDURE — 99204 PR OFFICE/OUTPT VISIT, NEW, LEVL IV, 45-59 MIN: ICD-10-PCS | Mod: 25,S$GLB,, | Performed by: SPECIALIST

## 2021-08-27 PROCEDURE — 31575 LARYNGOSCOPY: ICD-10-PCS | Mod: S$GLB,,, | Performed by: SPECIALIST

## 2021-08-27 PROCEDURE — 31575 DIAGNOSTIC LARYNGOSCOPY: CPT | Mod: PBBFAC,PN | Performed by: SPECIALIST

## 2021-08-27 PROCEDURE — 69210 EAR CERUMEN REMOVAL: ICD-10-PCS | Mod: 51,S$GLB,, | Performed by: SPECIALIST

## 2021-08-27 PROCEDURE — 69210 REMOVE IMPACTED EAR WAX UNI: CPT | Mod: 50,PBBFAC,PN | Performed by: SPECIALIST

## 2021-08-27 PROCEDURE — 99204 OFFICE O/P NEW MOD 45 MIN: CPT | Mod: 25,S$GLB,, | Performed by: SPECIALIST

## 2021-08-27 PROCEDURE — 99999 PR PBB SHADOW E&M-EST. PATIENT-LVL III: CPT | Mod: PBBFAC,,, | Performed by: SPECIALIST

## 2021-08-27 PROCEDURE — 99213 OFFICE O/P EST LOW 20 MIN: CPT | Mod: PBBFAC,PN,25 | Performed by: SPECIALIST

## 2021-08-27 PROCEDURE — 99999 PR PBB SHADOW E&M-EST. PATIENT-LVL III: ICD-10-PCS | Mod: PBBFAC,,, | Performed by: SPECIALIST

## 2021-08-27 RX ORDER — LORATADINE 10 MG/1
10 TABLET ORAL DAILY
Qty: 30 TABLET | Refills: 11
Start: 2021-08-27 | End: 2023-08-01

## 2021-08-27 RX ORDER — BENZONATATE 200 MG/1
200 CAPSULE ORAL 3 TIMES DAILY PRN
Qty: 40 CAPSULE | Refills: 3 | Status: SHIPPED | OUTPATIENT
Start: 2021-08-27 | End: 2021-09-06

## 2021-08-27 RX ORDER — PANTOPRAZOLE SODIUM 40 MG/1
TABLET, DELAYED RELEASE ORAL
Qty: 60 TABLET | Refills: 11 | Status: SHIPPED | OUTPATIENT
Start: 2021-08-27 | End: 2021-09-24 | Stop reason: SDUPTHER

## 2021-09-03 ENCOUNTER — CLINICAL SUPPORT (OUTPATIENT)
Dept: URGENT CARE | Facility: CLINIC | Age: 66
End: 2021-09-03
Payer: MEDICARE

## 2021-09-03 DIAGNOSIS — Z11.59 ENCOUNTER FOR SCREENING FOR OTHER VIRAL DISEASES: Primary | ICD-10-CM

## 2021-09-03 LAB
CTP QC/QA: YES
SARS-COV-2 RDRP RESP QL NAA+PROBE: NEGATIVE

## 2021-09-03 PROCEDURE — 99211 PR OFFICE/OUTPT VISIT, EST, LEVL I: ICD-10-PCS | Mod: S$GLB,CS,, | Performed by: EMERGENCY MEDICINE

## 2021-09-03 PROCEDURE — U0002 COVID-19 LAB TEST NON-CDC: HCPCS | Mod: QW,S$GLB,, | Performed by: EMERGENCY MEDICINE

## 2021-09-03 PROCEDURE — 99211 OFF/OP EST MAY X REQ PHY/QHP: CPT | Mod: S$GLB,CS,, | Performed by: EMERGENCY MEDICINE

## 2021-09-03 PROCEDURE — U0002: ICD-10-PCS | Mod: QW,S$GLB,, | Performed by: EMERGENCY MEDICINE

## 2021-09-08 ENCOUNTER — CLINICAL SUPPORT (OUTPATIENT)
Dept: URGENT CARE | Facility: CLINIC | Age: 66
End: 2021-09-08
Payer: MEDICARE

## 2021-09-08 ENCOUNTER — TELEPHONE (OUTPATIENT)
Dept: URGENT CARE | Facility: CLINIC | Age: 66
End: 2021-09-08

## 2021-09-08 DIAGNOSIS — U07.1 COVID-19 VIRUS INFECTION: Primary | ICD-10-CM

## 2021-09-08 DIAGNOSIS — Z11.52 ENCOUNTER FOR SCREENING FOR COVID-19: Primary | ICD-10-CM

## 2021-09-08 DIAGNOSIS — U07.1 COVID-19 VIRUS DETECTED: ICD-10-CM

## 2021-09-08 LAB
CTP QC/QA: YES
SARS-COV-2 RDRP RESP QL NAA+PROBE: POSITIVE

## 2021-09-08 PROCEDURE — 99211 PR OFFICE/OUTPT VISIT, EST, LEVL I: ICD-10-PCS | Mod: S$GLB,CS,, | Performed by: STUDENT IN AN ORGANIZED HEALTH CARE EDUCATION/TRAINING PROGRAM

## 2021-09-08 PROCEDURE — U0002 COVID-19 LAB TEST NON-CDC: HCPCS | Mod: QW,S$GLB,, | Performed by: STUDENT IN AN ORGANIZED HEALTH CARE EDUCATION/TRAINING PROGRAM

## 2021-09-08 PROCEDURE — 99211 OFF/OP EST MAY X REQ PHY/QHP: CPT | Mod: S$GLB,CS,, | Performed by: STUDENT IN AN ORGANIZED HEALTH CARE EDUCATION/TRAINING PROGRAM

## 2021-09-08 PROCEDURE — U0002: ICD-10-PCS | Mod: QW,S$GLB,, | Performed by: STUDENT IN AN ORGANIZED HEALTH CARE EDUCATION/TRAINING PROGRAM

## 2021-09-24 ENCOUNTER — OFFICE VISIT (OUTPATIENT)
Dept: OTOLARYNGOLOGY | Facility: CLINIC | Age: 66
End: 2021-09-24
Payer: MEDICARE

## 2021-09-24 VITALS
HEART RATE: 62 BPM | TEMPERATURE: 98 F | WEIGHT: 237 LBS | DIASTOLIC BLOOD PRESSURE: 50 MMHG | BODY MASS INDEX: 35 KG/M2 | SYSTOLIC BLOOD PRESSURE: 90 MMHG

## 2021-09-24 DIAGNOSIS — J30.9 ALLERGIC RHINITIS, UNSPECIFIED SEASONALITY, UNSPECIFIED TRIGGER: ICD-10-CM

## 2021-09-24 DIAGNOSIS — R05.9 COUGH: ICD-10-CM

## 2021-09-24 DIAGNOSIS — J34.2 NASAL SEPTAL DEVIATION: ICD-10-CM

## 2021-09-24 DIAGNOSIS — R09.82 PND (POST-NASAL DRIP): ICD-10-CM

## 2021-09-24 DIAGNOSIS — H91.93 BILATERAL HEARING LOSS, UNSPECIFIED HEARING LOSS TYPE: ICD-10-CM

## 2021-09-24 DIAGNOSIS — R13.10 DYSPHAGIA, UNSPECIFIED TYPE: Primary | ICD-10-CM

## 2021-09-24 DIAGNOSIS — K21.9 LARYNGOPHARYNGEAL REFLUX (LPR): ICD-10-CM

## 2021-09-24 DIAGNOSIS — Z97.4 HEARING AID WORN: ICD-10-CM

## 2021-09-24 PROCEDURE — 4010F PR ACE/ARB THEARPY RXD/TAKEN: ICD-10-PCS | Mod: CPTII,S$GLB,, | Performed by: SPECIALIST

## 2021-09-24 PROCEDURE — 99999 PR PBB SHADOW E&M-EST. PATIENT-LVL III: ICD-10-PCS | Mod: PBBFAC,,, | Performed by: SPECIALIST

## 2021-09-24 PROCEDURE — 99213 PR OFFICE/OUTPT VISIT, EST, LEVL III, 20-29 MIN: ICD-10-PCS | Mod: S$GLB,,, | Performed by: SPECIALIST

## 2021-09-24 PROCEDURE — 99213 OFFICE O/P EST LOW 20 MIN: CPT | Mod: S$GLB,,, | Performed by: SPECIALIST

## 2021-09-24 PROCEDURE — 99999 PR PBB SHADOW E&M-EST. PATIENT-LVL III: CPT | Mod: PBBFAC,,, | Performed by: SPECIALIST

## 2021-09-24 PROCEDURE — 99213 OFFICE O/P EST LOW 20 MIN: CPT | Mod: PBBFAC,PN | Performed by: SPECIALIST

## 2021-09-24 PROCEDURE — 4010F ACE/ARB THERAPY RXD/TAKEN: CPT | Mod: CPTII,S$GLB,, | Performed by: SPECIALIST

## 2021-09-24 RX ORDER — PANTOPRAZOLE SODIUM 40 MG/1
TABLET, DELAYED RELEASE ORAL
Qty: 180 TABLET | Refills: 3 | Status: SHIPPED | OUTPATIENT
Start: 2021-09-24 | End: 2022-03-22 | Stop reason: ALTCHOICE

## 2021-09-24 RX ORDER — BENZONATATE 200 MG/1
200 CAPSULE ORAL 3 TIMES DAILY PRN
Qty: 40 CAPSULE | Refills: 3 | Status: SHIPPED | OUTPATIENT
Start: 2021-09-24 | End: 2021-10-04

## 2021-11-19 ENCOUNTER — CLINICAL SUPPORT (OUTPATIENT)
Dept: URGENT CARE | Facility: CLINIC | Age: 66
End: 2021-11-19
Payer: MEDICARE

## 2021-11-19 DIAGNOSIS — Z11.9 ENCOUNTER FOR SCREENING EXAMINATION FOR INFECTIOUS DISEASE: Primary | ICD-10-CM

## 2021-11-19 LAB
CTP QC/QA: YES
SARS-COV-2 RDRP RESP QL NAA+PROBE: NEGATIVE

## 2021-11-19 PROCEDURE — U0002 COVID-19 LAB TEST NON-CDC: HCPCS | Mod: QW,S$GLB,, | Performed by: STUDENT IN AN ORGANIZED HEALTH CARE EDUCATION/TRAINING PROGRAM

## 2021-11-19 PROCEDURE — U0002: ICD-10-PCS | Mod: QW,S$GLB,, | Performed by: STUDENT IN AN ORGANIZED HEALTH CARE EDUCATION/TRAINING PROGRAM

## 2021-11-23 ENCOUNTER — OFFICE VISIT (OUTPATIENT)
Dept: OTOLARYNGOLOGY | Facility: CLINIC | Age: 66
End: 2021-11-23
Payer: MEDICARE

## 2021-11-23 VITALS
HEART RATE: 51 BPM | TEMPERATURE: 98 F | DIASTOLIC BLOOD PRESSURE: 56 MMHG | SYSTOLIC BLOOD PRESSURE: 105 MMHG | BODY MASS INDEX: 35.14 KG/M2 | WEIGHT: 238 LBS

## 2021-11-23 DIAGNOSIS — R13.10 DYSPHAGIA, UNSPECIFIED TYPE: Primary | ICD-10-CM

## 2021-11-23 DIAGNOSIS — J34.2 NASAL SEPTAL DEVIATION: ICD-10-CM

## 2021-11-23 DIAGNOSIS — Z97.4 HEARING AID WORN: ICD-10-CM

## 2021-11-23 DIAGNOSIS — K21.9 LARYNGOPHARYNGEAL REFLUX (LPR): ICD-10-CM

## 2021-11-23 DIAGNOSIS — R09.A2 GLOBUS SENSATION: ICD-10-CM

## 2021-11-23 DIAGNOSIS — H91.93 BILATERAL HEARING LOSS, UNSPECIFIED HEARING LOSS TYPE: ICD-10-CM

## 2021-11-23 DIAGNOSIS — J30.9 ALLERGIC RHINITIS, UNSPECIFIED SEASONALITY, UNSPECIFIED TRIGGER: ICD-10-CM

## 2021-11-23 DIAGNOSIS — R05.9 COUGH: ICD-10-CM

## 2021-11-23 DIAGNOSIS — R09.89 PHLEGM IN THROAT: ICD-10-CM

## 2021-11-23 DIAGNOSIS — R09.82 PND (POST-NASAL DRIP): ICD-10-CM

## 2021-11-23 PROCEDURE — 31575 DIAGNOSTIC LARYNGOSCOPY: CPT | Mod: PBBFAC,PN | Performed by: SPECIALIST

## 2021-11-23 PROCEDURE — 31575 PR LARYNGOSCOPY, FLEXIBLE; DIAGNOSTIC: ICD-10-PCS | Mod: S$GLB,,, | Performed by: SPECIALIST

## 2021-11-23 PROCEDURE — 99214 OFFICE O/P EST MOD 30 MIN: CPT | Mod: 25,S$GLB,, | Performed by: SPECIALIST

## 2021-11-23 PROCEDURE — 31575 DIAGNOSTIC LARYNGOSCOPY: CPT | Mod: S$GLB,,, | Performed by: SPECIALIST

## 2021-11-23 PROCEDURE — 99213 OFFICE O/P EST LOW 20 MIN: CPT | Mod: PBBFAC,PN | Performed by: SPECIALIST

## 2021-11-23 PROCEDURE — 99999 PR PBB SHADOW E&M-EST. PATIENT-LVL III: ICD-10-PCS | Mod: PBBFAC,,, | Performed by: SPECIALIST

## 2021-11-23 PROCEDURE — 99999 PR PBB SHADOW E&M-EST. PATIENT-LVL III: CPT | Mod: PBBFAC,,, | Performed by: SPECIALIST

## 2021-11-23 PROCEDURE — 99214 PR OFFICE/OUTPT VISIT, EST, LEVL IV, 30-39 MIN: ICD-10-PCS | Mod: 25,S$GLB,, | Performed by: SPECIALIST

## 2022-01-24 NOTE — PROGRESS NOTES
Subjective:       Patient ID: Pratik Laird is a 66 y.o. male.    Chief Complaint: Follow-up    Follow-up    The patient is returning for follow-up visit.  There are multiple issues to discuss:  1. Laryngopharyngeal reflux:   He has been using pantoprazole twice daily.    2. Allergic rhinitis:  Nasal secretions are clear.  He takes loratadine on a daily basis.    3. Sensorineural hearing loss/hearing aids worn:  He does have hearing aids which he wears routinely.  There has been no significant change in his hearing.      Review of Systems     Constitutional: Positive for fatigue.  Negative for appetite change and unexpected weight loss.      HENT: Positive for ear pain, postnasal drip, runny nose, sinus pressure, stuffy nose and trouble swallowing.  Negative for ear discharge, ear infection, facial swelling, hearing loss, mouth sores, nosebleeds, ringing in the ears, sinus infection, tonsil infection, dental problems and voice change.      Eyes:  Negative for change in eyesight, eye drainage, eye itching and photophobia.     Respiratory:  Negative for shortness of breath, sleep apnea, snoring and wheezing.      Cardiovascular:  Negative for foot swelling, irregular heartbeat and swollen veins.     Gastrointestinal:  Negative for acid reflux, constipation, diarrhea and heartburn.     Genitourinary: Negative for difficulty urinating and frequent urination.     Musc: Negative for aching joints, aching muscles and back pain.     Allergy: Negative for food allergies and seasonal allergies.     Endocrine: Negative for cold intolerance and heat intolerance.      Neurological: Negative for dizziness, light-headedness, seizures and tremors.      Hematologic: Negative for bruises/bleeds easily.      Psychiatric: Negative for decreased concentration, depression, nervous/anxious and sleep disturbance.                Objective:      Physical Exam  Vitals and nursing note reviewed.   Constitutional:       General: He is  awake.      Appearance: Normal appearance. He is well-developed, well-groomed and overweight.   HENT:      Head: Normocephalic.      Jaw: There is normal jaw occlusion.      Salivary Glands: Right salivary gland is not diffusely enlarged. Left salivary gland is not diffusely enlarged.      Right Ear: Ear canal and external ear normal. Decreased hearing (Wears hearing aid) noted. There is minimal cerumen. Tympanic membrane is retracted. Tympanic membrane has decreased mobility.      Left Ear: Ear canal and external ear normal. Decreased hearing ( wears hearing aid) noted. There is miniml cerumen. Tympanic membrane is retracted. Tympanic membrane has decreased mobility.      Nose: Septal deviation (To the left), mucosal edema (cyanotic, boggy inferior turbinates bilaterally) and rhinorrhea (clear mucus bilaterally) present. No nasal deformity. Rhinorrhea is clear.      Right Turbinates: Enlarged and pale.      Left Turbinates: Enlarged and pale.      Mouth/Throat: Tonsils 3+/4+     Lips: No lesions.      Mouth: Mucous membranes are moist. No oral lesions.      Dentition: No gum lesions.      Tongue: No lesions.      Palate: No mass and lesions.      Pharynx: Oropharynx is clear. Uvula midline.   Eyes:      General: Lids are normal.         Right eye: No discharge.         Left eye: No discharge.      Conjunctiva/sclera:      Right eye: Right conjunctiva is injected. No exudate.     Left eye: Left conjunctiva is injected. No exudate.     Pupils: Pupils are equal, round, and reactive to light.   Neck:      Thyroid: No thyroid mass or thyromegaly.      Trachea: Trachea normal. No tracheal deviation.   Cardiovascular:      Rate and Rhythm: Normal rate and regular rhythm.      Pulses: Normal pulses.      Heart sounds: Normal heart sounds.   Pulmonary:      Effort: Pulmonary effort is normal.      Breath sounds: Normal breath sounds. No stridor. No decreased breath sounds, wheezing, rhonchi or rales.   Abdominal:       General: Bowel sounds are normal.      Palpations: Abdomen is soft.      Tenderness: There is no abdominal tenderness.   Musculoskeletal:         General: Normal range of motion.      Cervical back: Normal range of motion. No muscular tenderness.   Lymphadenopathy:      Head:      Right side of head: No submental, submandibular, preauricular, posterior auricular or occipital adenopathy.      Left side of head: No submental, submandibular, preauricular, posterior auricular or occipital adenopathy.      Cervical: No cervical adenopathy.   Skin:     General: Skin is warm and dry.      Findings: No petechiae or rash.      Nails: There is no clubbing.   Neurological:      Mental Status: He is alert and oriented to person, place, and time.      Cranial Nerves: No cranial nerve deficit.      Sensory: No sensory deficit.      Gait: Gait normal.   Psychiatric:         Speech: Speech normal.         Behavior: Behavior normal. Behavior is cooperative.         Thought Content: Thought content normal.         Judgment: Judgment normal.         Assessment:       1. Phlegm in throat    2. Laryngopharyngeal reflux (LPR)    3. Allergic rhinitis, unspecified seasonality, unspecified trigger    4. PND (post-nasal drip)    5. Nasal septal deviation    6. Bilateral hearing loss, unspecified hearing loss type    7. Hearing aid worn        Plan:       I  will have the patient continue with his present drug regimen.  I will recheck him in 8 weeks.  He will need undergo flexible nasolaryngoscopy at that visit, so he will need COVID-19 testing done 3 days prior to the visit.

## 2022-01-25 ENCOUNTER — OFFICE VISIT (OUTPATIENT)
Dept: OTOLARYNGOLOGY | Facility: CLINIC | Age: 67
End: 2022-01-25
Payer: MEDICARE

## 2022-01-25 VITALS
HEART RATE: 55 BPM | TEMPERATURE: 97 F | WEIGHT: 237.88 LBS | BODY MASS INDEX: 35.13 KG/M2 | DIASTOLIC BLOOD PRESSURE: 56 MMHG | SYSTOLIC BLOOD PRESSURE: 109 MMHG

## 2022-01-25 DIAGNOSIS — J34.2 NASAL SEPTAL DEVIATION: ICD-10-CM

## 2022-01-25 DIAGNOSIS — Z97.4 HEARING AID WORN: ICD-10-CM

## 2022-01-25 DIAGNOSIS — R09.82 PND (POST-NASAL DRIP): ICD-10-CM

## 2022-01-25 DIAGNOSIS — H91.93 BILATERAL HEARING LOSS, UNSPECIFIED HEARING LOSS TYPE: ICD-10-CM

## 2022-01-25 DIAGNOSIS — Z01.818 PRE-OP TESTING: ICD-10-CM

## 2022-01-25 DIAGNOSIS — R09.89 PHLEGM IN THROAT: Primary | ICD-10-CM

## 2022-01-25 DIAGNOSIS — K21.9 LARYNGOPHARYNGEAL REFLUX (LPR): ICD-10-CM

## 2022-01-25 DIAGNOSIS — J30.9 ALLERGIC RHINITIS, UNSPECIFIED SEASONALITY, UNSPECIFIED TRIGGER: ICD-10-CM

## 2022-01-25 PROCEDURE — 1160F RVW MEDS BY RX/DR IN RCRD: CPT | Mod: CPTII,S$GLB,, | Performed by: SPECIALIST

## 2022-01-25 PROCEDURE — 99213 OFFICE O/P EST LOW 20 MIN: CPT | Mod: PBBFAC,PN | Performed by: SPECIALIST

## 2022-01-25 PROCEDURE — 99213 OFFICE O/P EST LOW 20 MIN: CPT | Mod: S$GLB,,, | Performed by: SPECIALIST

## 2022-01-25 PROCEDURE — 99213 PR OFFICE/OUTPT VISIT, EST, LEVL III, 20-29 MIN: ICD-10-PCS | Mod: S$GLB,,, | Performed by: SPECIALIST

## 2022-01-25 PROCEDURE — 3078F PR MOST RECENT DIASTOLIC BLOOD PRESSURE < 80 MM HG: ICD-10-PCS | Mod: CPTII,S$GLB,, | Performed by: SPECIALIST

## 2022-01-25 PROCEDURE — 1160F PR REVIEW ALL MEDS BY PRESCRIBER/CLIN PHARMACIST DOCUMENTED: ICD-10-PCS | Mod: CPTII,S$GLB,, | Performed by: SPECIALIST

## 2022-01-25 PROCEDURE — 3074F PR MOST RECENT SYSTOLIC BLOOD PRESSURE < 130 MM HG: ICD-10-PCS | Mod: CPTII,S$GLB,, | Performed by: SPECIALIST

## 2022-01-25 PROCEDURE — 3074F SYST BP LT 130 MM HG: CPT | Mod: CPTII,S$GLB,, | Performed by: SPECIALIST

## 2022-01-25 PROCEDURE — 3008F PR BODY MASS INDEX (BMI) DOCUMENTED: ICD-10-PCS | Mod: CPTII,S$GLB,, | Performed by: SPECIALIST

## 2022-01-25 PROCEDURE — 1101F PR PT FALLS ASSESS DOC 0-1 FALLS W/OUT INJ PAST YR: ICD-10-PCS | Mod: CPTII,S$GLB,, | Performed by: SPECIALIST

## 2022-01-25 PROCEDURE — 3288F PR FALLS RISK ASSESSMENT DOCUMENTED: ICD-10-PCS | Mod: CPTII,S$GLB,, | Performed by: SPECIALIST

## 2022-01-25 PROCEDURE — 1126F PR PAIN SEVERITY QUANTIFIED, NO PAIN PRESENT: ICD-10-PCS | Mod: CPTII,S$GLB,, | Performed by: SPECIALIST

## 2022-01-25 PROCEDURE — 1101F PT FALLS ASSESS-DOCD LE1/YR: CPT | Mod: CPTII,S$GLB,, | Performed by: SPECIALIST

## 2022-01-25 PROCEDURE — 99999 PR PBB SHADOW E&M-EST. PATIENT-LVL III: ICD-10-PCS | Mod: PBBFAC,,, | Performed by: SPECIALIST

## 2022-01-25 PROCEDURE — 1126F AMNT PAIN NOTED NONE PRSNT: CPT | Mod: CPTII,S$GLB,, | Performed by: SPECIALIST

## 2022-01-25 PROCEDURE — 3008F BODY MASS INDEX DOCD: CPT | Mod: CPTII,S$GLB,, | Performed by: SPECIALIST

## 2022-01-25 PROCEDURE — 1159F MED LIST DOCD IN RCRD: CPT | Mod: CPTII,S$GLB,, | Performed by: SPECIALIST

## 2022-01-25 PROCEDURE — 3078F DIAST BP <80 MM HG: CPT | Mod: CPTII,S$GLB,, | Performed by: SPECIALIST

## 2022-01-25 PROCEDURE — 3288F FALL RISK ASSESSMENT DOCD: CPT | Mod: CPTII,S$GLB,, | Performed by: SPECIALIST

## 2022-01-25 PROCEDURE — 1159F PR MEDICATION LIST DOCUMENTED IN MEDICAL RECORD: ICD-10-PCS | Mod: CPTII,S$GLB,, | Performed by: SPECIALIST

## 2022-01-25 PROCEDURE — 99999 PR PBB SHADOW E&M-EST. PATIENT-LVL III: CPT | Mod: PBBFAC,,, | Performed by: SPECIALIST

## 2022-01-26 ENCOUNTER — LAB VISIT (OUTPATIENT)
Dept: LAB | Facility: HOSPITAL | Age: 67
End: 2022-01-26
Attending: INTERNAL MEDICINE
Payer: MEDICARE

## 2022-01-26 DIAGNOSIS — N18.31 STAGE 3A CHRONIC KIDNEY DISEASE: ICD-10-CM

## 2022-01-26 DIAGNOSIS — Z12.5 PROSTATE CANCER SCREENING: ICD-10-CM

## 2022-01-26 DIAGNOSIS — I25.119 CORONARY ARTERY DISEASE INVOLVING NATIVE CORONARY ARTERY WITH ANGINA PECTORIS, UNSPECIFIED WHETHER NATIVE OR TRANSPLANTED HEART: ICD-10-CM

## 2022-01-26 DIAGNOSIS — E78.5 HYPERLIPIDEMIA, UNSPECIFIED HYPERLIPIDEMIA TYPE: ICD-10-CM

## 2022-01-26 DIAGNOSIS — I10 ESSENTIAL HYPERTENSION: ICD-10-CM

## 2022-01-26 LAB
ALBUMIN SERPL BCP-MCNC: 4 G/DL (ref 3.5–5.2)
ALP SERPL-CCNC: 70 U/L (ref 55–135)
ALT SERPL W/O P-5'-P-CCNC: 23 U/L (ref 10–44)
ANION GAP SERPL CALC-SCNC: 7 MMOL/L (ref 8–16)
AST SERPL-CCNC: 23 U/L (ref 10–40)
BASOPHILS # BLD AUTO: 0.06 K/UL (ref 0–0.2)
BASOPHILS NFR BLD: 1 % (ref 0–1.9)
BILIRUB SERPL-MCNC: 0.8 MG/DL (ref 0.1–1)
BUN SERPL-MCNC: 28 MG/DL (ref 8–23)
CALCIUM SERPL-MCNC: 9.6 MG/DL (ref 8.7–10.5)
CHLORIDE SERPL-SCNC: 106 MMOL/L (ref 95–110)
CHOLEST SERPL-MCNC: 114 MG/DL (ref 120–199)
CHOLEST/HDLC SERPL: 3.5 {RATIO} (ref 2–5)
CO2 SERPL-SCNC: 26 MMOL/L (ref 23–29)
COMPLEXED PSA SERPL-MCNC: 0.41 NG/ML (ref 0–4)
CREAT SERPL-MCNC: 1.4 MG/DL (ref 0.5–1.4)
DIFFERENTIAL METHOD: ABNORMAL
EOSINOPHIL # BLD AUTO: 0.2 K/UL (ref 0–0.5)
EOSINOPHIL NFR BLD: 3.2 % (ref 0–8)
ERYTHROCYTE [DISTWIDTH] IN BLOOD BY AUTOMATED COUNT: 12 % (ref 11.5–14.5)
EST. GFR  (AFRICAN AMERICAN): 59 ML/MIN/1.73 M^2
EST. GFR  (NON AFRICAN AMERICAN): 51 ML/MIN/1.73 M^2
GLUCOSE SERPL-MCNC: 112 MG/DL (ref 70–110)
HCT VFR BLD AUTO: 39.7 % (ref 40–54)
HDLC SERPL-MCNC: 33 MG/DL (ref 40–75)
HDLC SERPL: 28.9 % (ref 20–50)
HGB BLD-MCNC: 13.4 G/DL (ref 14–18)
IMM GRANULOCYTES # BLD AUTO: 0.02 K/UL (ref 0–0.04)
IMM GRANULOCYTES NFR BLD AUTO: 0.3 % (ref 0–0.5)
LDLC SERPL CALC-MCNC: 59.6 MG/DL (ref 63–159)
LYMPHOCYTES # BLD AUTO: 2 K/UL (ref 1–4.8)
LYMPHOCYTES NFR BLD: 31.2 % (ref 18–48)
MCH RBC QN AUTO: 34.3 PG (ref 27–31)
MCHC RBC AUTO-ENTMCNC: 33.8 G/DL (ref 32–36)
MCV RBC AUTO: 102 FL (ref 82–98)
MONOCYTES # BLD AUTO: 0.8 K/UL (ref 0.3–1)
MONOCYTES NFR BLD: 12.1 % (ref 4–15)
NEUTROPHILS # BLD AUTO: 3.3 K/UL (ref 1.8–7.7)
NEUTROPHILS NFR BLD: 52.2 % (ref 38–73)
NONHDLC SERPL-MCNC: 81 MG/DL
NRBC BLD-RTO: 0 /100 WBC
PLATELET # BLD AUTO: 161 K/UL (ref 150–450)
PMV BLD AUTO: 10.8 FL (ref 9.2–12.9)
POTASSIUM SERPL-SCNC: 4.7 MMOL/L (ref 3.5–5.1)
PROT SERPL-MCNC: 7.1 G/DL (ref 6–8.4)
RBC # BLD AUTO: 3.91 M/UL (ref 4.6–6.2)
SODIUM SERPL-SCNC: 139 MMOL/L (ref 136–145)
TRIGL SERPL-MCNC: 107 MG/DL (ref 30–150)
WBC # BLD AUTO: 6.26 K/UL (ref 3.9–12.7)

## 2022-01-26 PROCEDURE — 80053 COMPREHEN METABOLIC PANEL: CPT | Performed by: INTERNAL MEDICINE

## 2022-01-26 PROCEDURE — 85025 COMPLETE CBC W/AUTO DIFF WBC: CPT | Performed by: INTERNAL MEDICINE

## 2022-01-26 PROCEDURE — 80061 LIPID PANEL: CPT | Performed by: INTERNAL MEDICINE

## 2022-01-26 PROCEDURE — 36415 COLL VENOUS BLD VENIPUNCTURE: CPT | Performed by: INTERNAL MEDICINE

## 2022-01-26 PROCEDURE — 84153 ASSAY OF PSA TOTAL: CPT | Performed by: INTERNAL MEDICINE

## 2022-02-21 ENCOUNTER — TELEPHONE (OUTPATIENT)
Dept: DERMATOLOGY | Facility: CLINIC | Age: 67
End: 2022-02-21
Payer: MEDICARE

## 2022-02-21 NOTE — TELEPHONE ENCOUNTER
Spoke with patient who is new from Dr. Wren with [facility] on L cheek. Called to schedule Mohs but patient stated he is already scheduled with another Mohs surgeon on March 7.

## 2022-03-22 ENCOUNTER — OFFICE VISIT (OUTPATIENT)
Dept: OTOLARYNGOLOGY | Facility: CLINIC | Age: 67
End: 2022-03-22
Payer: MEDICARE

## 2022-03-22 VITALS
WEIGHT: 232.25 LBS | BODY MASS INDEX: 34.3 KG/M2 | DIASTOLIC BLOOD PRESSURE: 53 MMHG | HEART RATE: 68 BPM | TEMPERATURE: 98 F | SYSTOLIC BLOOD PRESSURE: 94 MMHG

## 2022-03-22 DIAGNOSIS — J01.90 ACUTE NON-RECURRENT SINUSITIS, UNSPECIFIED LOCATION: ICD-10-CM

## 2022-03-22 DIAGNOSIS — Z97.4 HEARING AID WORN: ICD-10-CM

## 2022-03-22 DIAGNOSIS — K21.9 LARYNGOPHARYNGEAL REFLUX (LPR): Primary | ICD-10-CM

## 2022-03-22 DIAGNOSIS — J34.2 NASAL SEPTAL DEVIATION: ICD-10-CM

## 2022-03-22 DIAGNOSIS — R09.89 PHLEGM IN THROAT: ICD-10-CM

## 2022-03-22 DIAGNOSIS — H91.93 BILATERAL HEARING LOSS, UNSPECIFIED HEARING LOSS TYPE: ICD-10-CM

## 2022-03-22 DIAGNOSIS — R09.82 PND (POST-NASAL DRIP): ICD-10-CM

## 2022-03-22 DIAGNOSIS — J30.9 ALLERGIC RHINITIS, UNSPECIFIED SEASONALITY, UNSPECIFIED TRIGGER: ICD-10-CM

## 2022-03-22 PROCEDURE — 1126F AMNT PAIN NOTED NONE PRSNT: CPT | Mod: CPTII,S$GLB,, | Performed by: SPECIALIST

## 2022-03-22 PROCEDURE — 3008F PR BODY MASS INDEX (BMI) DOCUMENTED: ICD-10-PCS | Mod: CPTII,S$GLB,, | Performed by: SPECIALIST

## 2022-03-22 PROCEDURE — 1159F MED LIST DOCD IN RCRD: CPT | Mod: CPTII,S$GLB,, | Performed by: SPECIALIST

## 2022-03-22 PROCEDURE — 99999 PR PBB SHADOW E&M-EST. PATIENT-LVL III: CPT | Mod: PBBFAC,,, | Performed by: SPECIALIST

## 2022-03-22 PROCEDURE — 1126F PR PAIN SEVERITY QUANTIFIED, NO PAIN PRESENT: ICD-10-PCS | Mod: CPTII,S$GLB,, | Performed by: SPECIALIST

## 2022-03-22 PROCEDURE — 1101F PT FALLS ASSESS-DOCD LE1/YR: CPT | Mod: CPTII,S$GLB,, | Performed by: SPECIALIST

## 2022-03-22 PROCEDURE — 3074F SYST BP LT 130 MM HG: CPT | Mod: CPTII,S$GLB,, | Performed by: SPECIALIST

## 2022-03-22 PROCEDURE — 1101F PR PT FALLS ASSESS DOC 0-1 FALLS W/OUT INJ PAST YR: ICD-10-PCS | Mod: CPTII,S$GLB,, | Performed by: SPECIALIST

## 2022-03-22 PROCEDURE — 3008F BODY MASS INDEX DOCD: CPT | Mod: CPTII,S$GLB,, | Performed by: SPECIALIST

## 2022-03-22 PROCEDURE — 31575 LARYNGOSCOPY: ICD-10-PCS | Mod: S$GLB,,, | Performed by: SPECIALIST

## 2022-03-22 PROCEDURE — 99213 OFFICE O/P EST LOW 20 MIN: CPT | Mod: PBBFAC,PN,25 | Performed by: SPECIALIST

## 2022-03-22 PROCEDURE — 3074F PR MOST RECENT SYSTOLIC BLOOD PRESSURE < 130 MM HG: ICD-10-PCS | Mod: CPTII,S$GLB,, | Performed by: SPECIALIST

## 2022-03-22 PROCEDURE — 31575 DIAGNOSTIC LARYNGOSCOPY: CPT | Mod: PBBFAC,PN | Performed by: SPECIALIST

## 2022-03-22 PROCEDURE — 3078F PR MOST RECENT DIASTOLIC BLOOD PRESSURE < 80 MM HG: ICD-10-PCS | Mod: CPTII,S$GLB,, | Performed by: SPECIALIST

## 2022-03-22 PROCEDURE — 1159F PR MEDICATION LIST DOCUMENTED IN MEDICAL RECORD: ICD-10-PCS | Mod: CPTII,S$GLB,, | Performed by: SPECIALIST

## 2022-03-22 PROCEDURE — 3288F PR FALLS RISK ASSESSMENT DOCUMENTED: ICD-10-PCS | Mod: CPTII,S$GLB,, | Performed by: SPECIALIST

## 2022-03-22 PROCEDURE — 1160F PR REVIEW ALL MEDS BY PRESCRIBER/CLIN PHARMACIST DOCUMENTED: ICD-10-PCS | Mod: CPTII,S$GLB,, | Performed by: SPECIALIST

## 2022-03-22 PROCEDURE — 99214 OFFICE O/P EST MOD 30 MIN: CPT | Mod: 25,S$GLB,, | Performed by: SPECIALIST

## 2022-03-22 PROCEDURE — 1160F RVW MEDS BY RX/DR IN RCRD: CPT | Mod: CPTII,S$GLB,, | Performed by: SPECIALIST

## 2022-03-22 PROCEDURE — 99999 PR PBB SHADOW E&M-EST. PATIENT-LVL III: ICD-10-PCS | Mod: PBBFAC,,, | Performed by: SPECIALIST

## 2022-03-22 PROCEDURE — 3078F DIAST BP <80 MM HG: CPT | Mod: CPTII,S$GLB,, | Performed by: SPECIALIST

## 2022-03-22 PROCEDURE — 99214 PR OFFICE/OUTPT VISIT, EST, LEVL IV, 30-39 MIN: ICD-10-PCS | Mod: 25,S$GLB,, | Performed by: SPECIALIST

## 2022-03-22 PROCEDURE — 3288F FALL RISK ASSESSMENT DOCD: CPT | Mod: CPTII,S$GLB,, | Performed by: SPECIALIST

## 2022-03-22 RX ORDER — FAMOTIDINE 20 MG/1
20 TABLET, FILM COATED ORAL 2 TIMES DAILY
Qty: 60 TABLET | Refills: 11 | Status: SHIPPED | OUTPATIENT
Start: 2022-03-22 | End: 2022-03-22 | Stop reason: CLARIF

## 2022-03-22 RX ORDER — FAMOTIDINE 20 MG/1
20 TABLET, FILM COATED ORAL 2 TIMES DAILY
Qty: 180 TABLET | Refills: 3 | Status: SHIPPED | OUTPATIENT
Start: 2022-03-22 | End: 2022-03-25 | Stop reason: SDUPTHER

## 2022-03-22 RX ORDER — AZITHROMYCIN 500 MG/1
500 TABLET, FILM COATED ORAL DAILY
Qty: 3 TABLET | Refills: 1 | Status: SHIPPED | OUTPATIENT
Start: 2022-03-22 | End: 2022-03-22 | Stop reason: CLARIF

## 2022-03-22 RX ORDER — AZITHROMYCIN 500 MG/1
500 TABLET, FILM COATED ORAL DAILY
Qty: 3 TABLET | Refills: 1 | Status: SHIPPED | OUTPATIENT
Start: 2022-03-22 | End: 2022-03-25

## 2022-03-22 NOTE — PROCEDURES
"Laryngoscopy    Date/Time: 3/22/2022 2:00 PM  Performed by: BERNARDO Esquivel MD  Authorized by: BERNARDO Esquivel MD     Time out: Immediately prior to procedure a "time out" was called to verify the correct patient, procedure, equipment, support staff and site/side marked as required.    Consent Done?:  Yes (Verbal)  Anesthesia:     Local anesthetic:  4% Xylocaine spray with Benji-Synephrine (Topical aerosol)    Patient tolerance:  Patient tolerated the procedure well with no immediate complications    Decongestion performed?: Yes    Laryngoscopy:     Areas examined:  Vocal cords, larynx, hypopharynx, oropharynx, nasopharynx and nasal cavities    Laryngoscope size:  4 mm (Flexible video nasolaryngoscopy)  Nose External:      No external nasal deformity  Nose Intranasal:      Mucosa no polyps     Mucosa ulcers not present     Mucosa lesions present (Profuse clear mucus in the nasal passages with turbid mucus in the middle meatus bilaterally )     Septum gross deformity ( septum deviated to the left)     Enlarged turbinates ( inferior turbinates enlarged)  Nasopharynx:      No mucosa lesions     Adenoids not present     Posterior choanae patent     Eustachian tube patent  Larynx/hypopharynx:      No epiglottis lesions     No epiglottis edema     No AE folds lesions     No vocal cord polyps     Equal and normal bilateral ( vocal cords move symmetrically, no mucosal lesions noted)     No hypopharynx lesions     No piriform sinus pooling     No piriform sinus lesions     Post cricoid edema ( minimal, improved from last endoscopy)     Post cricoid erythema ( minimal, improved from last endoscopy)     Flexible video nasolaryngoscopy-nasal septal deviation and nasal changes of allergic rhinitis; laryngeal changes consistent with laryngopharyngeal reflux that has improved significantly on b.i.d. PPI therapy      "

## 2022-03-25 RX ORDER — FAMOTIDINE 20 MG/1
20 TABLET, FILM COATED ORAL 2 TIMES DAILY
Qty: 180 TABLET | Refills: 3 | Status: SHIPPED | OUTPATIENT
Start: 2022-03-25 | End: 2022-04-06 | Stop reason: SDUPTHER

## 2022-04-06 RX ORDER — FAMOTIDINE 20 MG/1
20 TABLET, FILM COATED ORAL 2 TIMES DAILY
Qty: 180 TABLET | Refills: 3 | Status: SHIPPED | OUTPATIENT
Start: 2022-04-06 | End: 2023-03-20

## 2022-05-03 ENCOUNTER — OFFICE VISIT (OUTPATIENT)
Dept: OTOLARYNGOLOGY | Facility: CLINIC | Age: 67
End: 2022-05-03
Payer: MEDICARE

## 2022-05-03 VITALS
BODY MASS INDEX: 34.02 KG/M2 | DIASTOLIC BLOOD PRESSURE: 58 MMHG | WEIGHT: 230.38 LBS | HEART RATE: 65 BPM | TEMPERATURE: 98 F | SYSTOLIC BLOOD PRESSURE: 112 MMHG

## 2022-05-03 DIAGNOSIS — R09.82 PND (POST-NASAL DRIP): ICD-10-CM

## 2022-05-03 DIAGNOSIS — R09.89 THROAT CLEARING: ICD-10-CM

## 2022-05-03 DIAGNOSIS — J30.9 ALLERGIC RHINITIS, UNSPECIFIED SEASONALITY, UNSPECIFIED TRIGGER: ICD-10-CM

## 2022-05-03 DIAGNOSIS — J34.2 NASAL SEPTAL DEVIATION: ICD-10-CM

## 2022-05-03 DIAGNOSIS — H91.93 BILATERAL HEARING LOSS, UNSPECIFIED HEARING LOSS TYPE: ICD-10-CM

## 2022-05-03 DIAGNOSIS — K21.9 LARYNGOPHARYNGEAL REFLUX (LPR): Primary | ICD-10-CM

## 2022-05-03 DIAGNOSIS — R13.10 DYSPHAGIA, UNSPECIFIED TYPE: ICD-10-CM

## 2022-05-03 DIAGNOSIS — Z97.4 HEARING AID WORN: ICD-10-CM

## 2022-05-03 PROCEDURE — 4010F ACE/ARB THERAPY RXD/TAKEN: CPT | Mod: CPTII,S$GLB,, | Performed by: SPECIALIST

## 2022-05-03 PROCEDURE — 4010F PR ACE/ARB THEARPY RXD/TAKEN: ICD-10-PCS | Mod: CPTII,S$GLB,, | Performed by: SPECIALIST

## 2022-05-03 PROCEDURE — 3074F SYST BP LT 130 MM HG: CPT | Mod: CPTII,S$GLB,, | Performed by: SPECIALIST

## 2022-05-03 PROCEDURE — 3078F DIAST BP <80 MM HG: CPT | Mod: CPTII,S$GLB,, | Performed by: SPECIALIST

## 2022-05-03 PROCEDURE — 3078F PR MOST RECENT DIASTOLIC BLOOD PRESSURE < 80 MM HG: ICD-10-PCS | Mod: CPTII,S$GLB,, | Performed by: SPECIALIST

## 2022-05-03 PROCEDURE — 3288F PR FALLS RISK ASSESSMENT DOCUMENTED: ICD-10-PCS | Mod: CPTII,S$GLB,, | Performed by: SPECIALIST

## 2022-05-03 PROCEDURE — 3074F PR MOST RECENT SYSTOLIC BLOOD PRESSURE < 130 MM HG: ICD-10-PCS | Mod: CPTII,S$GLB,, | Performed by: SPECIALIST

## 2022-05-03 PROCEDURE — 1159F MED LIST DOCD IN RCRD: CPT | Mod: CPTII,S$GLB,, | Performed by: SPECIALIST

## 2022-05-03 PROCEDURE — 3288F FALL RISK ASSESSMENT DOCD: CPT | Mod: CPTII,S$GLB,, | Performed by: SPECIALIST

## 2022-05-03 PROCEDURE — 1126F AMNT PAIN NOTED NONE PRSNT: CPT | Mod: CPTII,S$GLB,, | Performed by: SPECIALIST

## 2022-05-03 PROCEDURE — 3008F BODY MASS INDEX DOCD: CPT | Mod: CPTII,S$GLB,, | Performed by: SPECIALIST

## 2022-05-03 PROCEDURE — 3008F PR BODY MASS INDEX (BMI) DOCUMENTED: ICD-10-PCS | Mod: CPTII,S$GLB,, | Performed by: SPECIALIST

## 2022-05-03 PROCEDURE — 1159F PR MEDICATION LIST DOCUMENTED IN MEDICAL RECORD: ICD-10-PCS | Mod: CPTII,S$GLB,, | Performed by: SPECIALIST

## 2022-05-03 PROCEDURE — 1101F PT FALLS ASSESS-DOCD LE1/YR: CPT | Mod: CPTII,S$GLB,, | Performed by: SPECIALIST

## 2022-05-03 PROCEDURE — 99213 OFFICE O/P EST LOW 20 MIN: CPT | Mod: S$GLB,,, | Performed by: SPECIALIST

## 2022-05-03 PROCEDURE — 1160F PR REVIEW ALL MEDS BY PRESCRIBER/CLIN PHARMACIST DOCUMENTED: ICD-10-PCS | Mod: CPTII,S$GLB,, | Performed by: SPECIALIST

## 2022-05-03 PROCEDURE — 1101F PR PT FALLS ASSESS DOC 0-1 FALLS W/OUT INJ PAST YR: ICD-10-PCS | Mod: CPTII,S$GLB,, | Performed by: SPECIALIST

## 2022-05-03 PROCEDURE — 1126F PR PAIN SEVERITY QUANTIFIED, NO PAIN PRESENT: ICD-10-PCS | Mod: CPTII,S$GLB,, | Performed by: SPECIALIST

## 2022-05-03 PROCEDURE — 99999 PR PBB SHADOW E&M-EST. PATIENT-LVL III: CPT | Mod: PBBFAC,,, | Performed by: SPECIALIST

## 2022-05-03 PROCEDURE — 1160F RVW MEDS BY RX/DR IN RCRD: CPT | Mod: CPTII,S$GLB,, | Performed by: SPECIALIST

## 2022-05-03 PROCEDURE — 99213 PR OFFICE/OUTPT VISIT, EST, LEVL III, 20-29 MIN: ICD-10-PCS | Mod: S$GLB,,, | Performed by: SPECIALIST

## 2022-05-03 PROCEDURE — 99999 PR PBB SHADOW E&M-EST. PATIENT-LVL III: ICD-10-PCS | Mod: PBBFAC,,, | Performed by: SPECIALIST

## 2022-05-03 NOTE — PROGRESS NOTES
Subjective:       Patient ID: Pratik Laird is a 67 y.o. male.    Chief Complaint: Follow-up    Follow-up    The patient is returning for follow-up visit.  There are multiple issues to discuss:  1. Laryngopharyngeal reflux:  The patient has noticed no significant detrimental changes since switching from pantoprazole to famotidine.  He does have some throat clearing but continues to improve.  The throat clearing seems to be exacerbated by postnasal drip.  2. Allergic rhinitis:  He has had significant increase in nasal congestion and postnasal drip that is causing her have more throat clearing.  He takes loratadine on a daily basis.    3. Sensorineural hearing loss/hearing aids worn:  He does have hearing aids which he wears routinely.  There has been no significant change in his hearing.  His right hearing aid is not functioning properly currently.      Review of Systems     Constitutional: Positive for fatigue.  Negative for appetite change and unexpected weight loss.      HENT: Positive for ear pain, postnasal drip, runny nose, sinus pressure, stuffy nose and trouble swallowing.  Negative for ear discharge, ear infection, facial swelling, hearing loss, mouth sores, nosebleeds, ringing in the ears, sinus infection, tonsil infection, dental problems and voice change.      Eyes:  Negative for change in eyesight, eye drainage, eye itching and photophobia.     Respiratory:  Negative for shortness of breath, sleep apnea, snoring and wheezing.      Cardiovascular:  Negative for foot swelling, irregular heartbeat and swollen veins.     Gastrointestinal:  Negative for acid reflux, constipation, diarrhea and heartburn.     Genitourinary: Negative for difficulty urinating and frequent urination.     Musc: Negative for aching joints, aching muscles and back pain.     Allergy: Negative for food allergies and seasonal allergies.     Endocrine: Negative for cold intolerance and heat intolerance.      Neurological: Negative  for dizziness, light-headedness, seizures and tremors.      Hematologic: Negative for bruises/bleeds easily.      Psychiatric: Negative for decreased concentration, depression, nervous/anxious and sleep disturbance.                Objective:      Physical Exam  Vitals and nursing note reviewed.   Constitutional:       General: He is awake.      Appearance: Normal appearance. He is well-developed, well-groomed and overweight.   HENT:      Head: Normocephalic.      Jaw: There is normal jaw occlusion.      Salivary Glands: Right salivary gland is not diffusely enlarged. Left salivary gland is not diffusely enlarged.      Right Ear: Ear canal and external ear normal. Decreased hearing (Wears hearing aid) noted. There is minimal cerumen. Tympanic membrane is retracted. Tympanic membrane has decreased mobility.      Left Ear: Ear canal and external ear normal. Decreased hearing ( wears hearing aid) noted. There is miniml cerumen. Tympanic membrane is retracted. Tympanic membrane has decreased mobility.      Nose: Septal deviation (To the left), mucosal edema (cyanotic, boggy inferior turbinates bilaterally) and rhinorrhea (clear mucus bilaterally) present. No nasal deformity. Rhinorrhea is clear.      Right Turbinates: Enlarged and pale.      Left Turbinates: Enlarged and pale.      Mouth/Throat: Tonsils 3+/4+     Lips: No lesions.      Mouth: Mucous membranes are moist. No oral lesions.      Dentition: No gum lesions.      Tongue: No lesions.      Palate: No mass and lesions.      Pharynx: Oropharynx is clear. Uvula midline.   Eyes:      General: Lids are normal.         Right eye: No discharge.         Left eye: No discharge.      Conjunctiva/sclera:      Right eye: Right conjunctiva is injected. No exudate.     Left eye: Left conjunctiva is injected. No exudate.     Pupils: Pupils are equal, round, and reactive to light.   Neck:      Thyroid: No thyroid mass or thyromegaly.      Trachea: Trachea normal. No tracheal  deviation.   Cardiovascular:      Rate and Rhythm: Normal rate and regular rhythm.      Pulses: Normal pulses.      Heart sounds: Normal heart sounds.   Pulmonary:      Effort: Pulmonary effort is normal.      Breath sounds: Normal breath sounds. No stridor. No decreased breath sounds, wheezing, rhonchi or rales.   Abdominal:      General: Bowel sounds are normal.      Palpations: Abdomen is soft.      Tenderness: There is no abdominal tenderness.   Musculoskeletal:         General: Normal range of motion.      Cervical back: Normal range of motion. No muscular tenderness.   Lymphadenopathy:      Head:      Right side of head: No submental, submandibular, preauricular, posterior auricular or occipital adenopathy.      Left side of head: No submental, submandibular, preauricular, posterior auricular or occipital adenopathy.      Cervical: No cervical adenopathy.   Skin:     General: Skin is warm and dry.      Findings: No petechiae or rash.      Nails: There is no clubbing.   Neurological:      Mental Status: He is alert and oriented to person, place, and time.      Cranial Nerves: No cranial nerve deficit.      Sensory: No sensory deficit.      Gait: Gait normal.   Psychiatric:         Speech: Speech normal.         Behavior: Behavior normal. Behavior is cooperative.         Thought Content: Thought content normal.         Judgment: Judgment normal.             Assessment:       1. Laryngopharyngeal reflux (LPR)    2. Dysphagia, unspecified type    3. Throat clearing    4. Allergic rhinitis, unspecified seasonality, unspecified trigger    5. PND (post-nasal drip)    6. Bilateral hearing loss, unspecified hearing loss type    7. Hearing aid worn    8. Nasal septal deviation        Plan:       I   will have the patient continue with his current medical regimen.  I will recheck him in 6 weeks.  At that visit she will need to undergo flexible nasolaryngoscopy.

## 2022-06-16 ENCOUNTER — OFFICE VISIT (OUTPATIENT)
Dept: OTOLARYNGOLOGY | Facility: CLINIC | Age: 67
End: 2022-06-16
Payer: MEDICARE

## 2022-06-16 VITALS
HEART RATE: 68 BPM | BODY MASS INDEX: 35.1 KG/M2 | DIASTOLIC BLOOD PRESSURE: 55 MMHG | SYSTOLIC BLOOD PRESSURE: 109 MMHG | TEMPERATURE: 98 F | WEIGHT: 237.63 LBS

## 2022-06-16 DIAGNOSIS — R09.82 PND (POST-NASAL DRIP): ICD-10-CM

## 2022-06-16 DIAGNOSIS — J30.9 ALLERGIC RHINITIS, UNSPECIFIED SEASONALITY, UNSPECIFIED TRIGGER: ICD-10-CM

## 2022-06-16 DIAGNOSIS — K21.9 LARYNGOPHARYNGEAL REFLUX (LPR): Primary | ICD-10-CM

## 2022-06-16 DIAGNOSIS — J34.2 NASAL SEPTAL DEVIATION: ICD-10-CM

## 2022-06-16 DIAGNOSIS — R09.89 THROAT CLEARING: ICD-10-CM

## 2022-06-16 DIAGNOSIS — R13.10 DYSPHAGIA, UNSPECIFIED TYPE: ICD-10-CM

## 2022-06-16 DIAGNOSIS — Z97.4 HEARING AID WORN: ICD-10-CM

## 2022-06-16 DIAGNOSIS — H91.93 BILATERAL HEARING LOSS, UNSPECIFIED HEARING LOSS TYPE: ICD-10-CM

## 2022-06-16 PROCEDURE — 1159F MED LIST DOCD IN RCRD: CPT | Mod: CPTII,S$GLB,, | Performed by: SPECIALIST

## 2022-06-16 PROCEDURE — 3078F DIAST BP <80 MM HG: CPT | Mod: CPTII,S$GLB,, | Performed by: SPECIALIST

## 2022-06-16 PROCEDURE — 3008F PR BODY MASS INDEX (BMI) DOCUMENTED: ICD-10-PCS | Mod: CPTII,S$GLB,, | Performed by: SPECIALIST

## 2022-06-16 PROCEDURE — 99999 PR PBB SHADOW E&M-EST. PATIENT-LVL III: ICD-10-PCS | Mod: PBBFAC,,, | Performed by: SPECIALIST

## 2022-06-16 PROCEDURE — 99213 PR OFFICE/OUTPT VISIT, EST, LEVL III, 20-29 MIN: ICD-10-PCS | Mod: S$GLB,,, | Performed by: SPECIALIST

## 2022-06-16 PROCEDURE — 1101F PT FALLS ASSESS-DOCD LE1/YR: CPT | Mod: CPTII,S$GLB,, | Performed by: SPECIALIST

## 2022-06-16 PROCEDURE — 4010F ACE/ARB THERAPY RXD/TAKEN: CPT | Mod: CPTII,S$GLB,, | Performed by: SPECIALIST

## 2022-06-16 PROCEDURE — 3008F BODY MASS INDEX DOCD: CPT | Mod: CPTII,S$GLB,, | Performed by: SPECIALIST

## 2022-06-16 PROCEDURE — 99213 OFFICE O/P EST LOW 20 MIN: CPT | Mod: PBBFAC,PN | Performed by: SPECIALIST

## 2022-06-16 PROCEDURE — 4010F PR ACE/ARB THEARPY RXD/TAKEN: ICD-10-PCS | Mod: CPTII,S$GLB,, | Performed by: SPECIALIST

## 2022-06-16 PROCEDURE — 1126F AMNT PAIN NOTED NONE PRSNT: CPT | Mod: CPTII,S$GLB,, | Performed by: SPECIALIST

## 2022-06-16 PROCEDURE — 1101F PR PT FALLS ASSESS DOC 0-1 FALLS W/OUT INJ PAST YR: ICD-10-PCS | Mod: CPTII,S$GLB,, | Performed by: SPECIALIST

## 2022-06-16 PROCEDURE — 1160F PR REVIEW ALL MEDS BY PRESCRIBER/CLIN PHARMACIST DOCUMENTED: ICD-10-PCS | Mod: CPTII,S$GLB,, | Performed by: SPECIALIST

## 2022-06-16 PROCEDURE — 99213 OFFICE O/P EST LOW 20 MIN: CPT | Mod: S$GLB,,, | Performed by: SPECIALIST

## 2022-06-16 PROCEDURE — 1160F RVW MEDS BY RX/DR IN RCRD: CPT | Mod: CPTII,S$GLB,, | Performed by: SPECIALIST

## 2022-06-16 PROCEDURE — 3288F PR FALLS RISK ASSESSMENT DOCUMENTED: ICD-10-PCS | Mod: CPTII,S$GLB,, | Performed by: SPECIALIST

## 2022-06-16 PROCEDURE — 1159F PR MEDICATION LIST DOCUMENTED IN MEDICAL RECORD: ICD-10-PCS | Mod: CPTII,S$GLB,, | Performed by: SPECIALIST

## 2022-06-16 PROCEDURE — 99999 PR PBB SHADOW E&M-EST. PATIENT-LVL III: CPT | Mod: PBBFAC,,, | Performed by: SPECIALIST

## 2022-06-16 PROCEDURE — 3074F SYST BP LT 130 MM HG: CPT | Mod: CPTII,S$GLB,, | Performed by: SPECIALIST

## 2022-06-16 PROCEDURE — 3078F PR MOST RECENT DIASTOLIC BLOOD PRESSURE < 80 MM HG: ICD-10-PCS | Mod: CPTII,S$GLB,, | Performed by: SPECIALIST

## 2022-06-16 PROCEDURE — 3288F FALL RISK ASSESSMENT DOCD: CPT | Mod: CPTII,S$GLB,, | Performed by: SPECIALIST

## 2022-06-16 PROCEDURE — 3074F PR MOST RECENT SYSTOLIC BLOOD PRESSURE < 130 MM HG: ICD-10-PCS | Mod: CPTII,S$GLB,, | Performed by: SPECIALIST

## 2022-06-16 PROCEDURE — 1126F PR PAIN SEVERITY QUANTIFIED, NO PAIN PRESENT: ICD-10-PCS | Mod: CPTII,S$GLB,, | Performed by: SPECIALIST

## 2022-06-16 NOTE — PROGRESS NOTES
Subjective:       Patient ID: Pratik Laird is a 67 y.o. male.    Chief Complaint: Follow-up    Follow-up    The patient is returning for follow-up visit.  There are multiple issues to discuss:  1. Laryngopharyngeal reflux:  The patient is having significant improvement of symptoms using   famotidine.  His throat clearing and phlegm in the throat are markedly improved.  2. Allergic rhinitis:  He uses Claritin on an as-needed basis.  When he is having symptoms the Claritin controls.    3. Sensorineural hearing loss/hearing aids worn:  He does have hearing aids which he wears routinely.  He is not having issues with feedback from his hearing aids.    Review of Systems     Constitutional: Positive for fatigue.  Negative for appetite change and unexpected weight loss.      HENT: Positive for ear pain, postnasal drip, runny nose, sinus pressure, stuffy nose and trouble swallowing.  Negative for ear discharge, ear infection, facial swelling, hearing loss, mouth sores, nosebleeds, ringing in the ears, sinus infection, tonsil infection, dental problems and voice change.      Eyes:  Negative for change in eyesight, eye drainage, eye itching and photophobia.     Respiratory:  Negative for shortness of breath, sleep apnea, snoring and wheezing.      Cardiovascular:  Negative for foot swelling, irregular heartbeat and swollen veins.     Gastrointestinal:  Negative for acid reflux, constipation, diarrhea and heartburn.     Genitourinary: Negative for difficulty urinating and frequent urination.     Musc: Negative for aching joints, aching muscles and back pain.     Allergy: Negative for food allergies and seasonal allergies.     Endocrine: Negative for cold intolerance and heat intolerance.      Neurological: Negative for dizziness, light-headedness, seizures and tremors.      Hematologic: Negative for bruises/bleeds easily.      Psychiatric: Negative for decreased concentration, depression, nervous/anxious and sleep  disturbance.                Objective:      Physical Exam  Vitals and nursing note reviewed.   Constitutional:       General: He is awake.      Appearance: Normal appearance. He is well-developed, well-groomed and overweight.   HENT:      Head: Normocephalic.      Jaw: There is normal jaw occlusion.      Salivary Glands: Right salivary gland is not diffusely enlarged. Left salivary gland is not diffusely enlarged.      Right Ear: Ear canal and external ear normal. Decreased hearing (Wears hearing aid) noted. There is minimal cerumen. Tympanic membrane is retracted. Tympanic membrane has decreased mobility.      Left Ear: Ear canal and external ear normal. Decreased hearing ( wears hearing aid) noted. There is miniml cerumen. Tympanic membrane is retracted. Tympanic membrane has decreased mobility.      Nose: Septal deviation (To the left), mucosal edema (cyanotic, boggy inferior turbinates bilaterally) and rhinorrhea (clear mucus bilaterally) present. No nasal deformity. Rhinorrhea is clear.      Right Turbinates: Enlarged and pale.      Left Turbinates: Enlarged and pale.      Mouth/Throat: Tonsils 3+/4+     Lips: No lesions.      Mouth: Mucous membranes are moist. No oral lesions.      Dentition: No gum lesions.      Tongue: No lesions.      Palate: No mass and lesions.      Pharynx: Oropharynx is clear. Uvula midline.   Eyes:      General: Lids are normal.         Right eye: No discharge.         Left eye: No discharge.      Conjunctiva/sclera:      Right eye: Right conjunctiva is injected. No exudate.     Left eye: Left conjunctiva is injected. No exudate.     Pupils: Pupils are equal, round, and reactive to light.   Neck:      Thyroid: No thyroid mass or thyromegaly.      Trachea: Trachea normal. No tracheal deviation.   Cardiovascular:      Rate and Rhythm: Normal rate and regular rhythm.      Pulses: Normal pulses.      Heart sounds: Normal heart sounds.   Pulmonary:      Effort: Pulmonary effort is normal.       Breath sounds: Normal breath sounds. No stridor. No decreased breath sounds, wheezing, rhonchi or rales.   Abdominal:      General: Bowel sounds are normal.      Palpations: Abdomen is soft.      Tenderness: There is no abdominal tenderness.   Musculoskeletal:         General: Normal range of motion.      Cervical back: Normal range of motion. No muscular tenderness.   Lymphadenopathy:      Head:      Right side of head: No submental, submandibular, preauricular, posterior auricular or occipital adenopathy.      Left side of head: No submental, submandibular, preauricular, posterior auricular or occipital adenopathy.      Cervical: No cervical adenopathy.   Skin:     General: Skin is warm and dry.      Findings: No petechiae or rash.      Nails: There is no clubbing.   Neurological:      Mental Status: He is alert and oriented to person, place, and time.      Cranial Nerves: No cranial nerve deficit.      Sensory: No sensory deficit.      Gait: Gait normal.   Psychiatric:         Speech: Speech normal.         Behavior: Behavior normal. Behavior is cooperative.         Thought Content: Thought content normal.         Judgment: Judgment normal.             Assessment:       1. Laryngopharyngeal reflux (LPR)    2. Dysphagia, unspecified type    3. Throat clearing    4. Allergic rhinitis, unspecified seasonality, unspecified trigger    5. PND (post-nasal drip)    6. Bilateral hearing loss, unspecified hearing loss type    7. Hearing aid worn    8. Nasal septal deviation        Plan:       I   will have the patient continue with his current drug regimen.  I will recheck him in 6 weeks.  At that visit he will need undergo a flexible nasolaryngoscopy.

## 2022-08-01 ENCOUNTER — OFFICE VISIT (OUTPATIENT)
Dept: OTOLARYNGOLOGY | Facility: CLINIC | Age: 67
End: 2022-08-01
Payer: MEDICARE

## 2022-08-01 VITALS
SYSTOLIC BLOOD PRESSURE: 107 MMHG | WEIGHT: 243.19 LBS | HEART RATE: 61 BPM | TEMPERATURE: 98 F | BODY MASS INDEX: 35.91 KG/M2 | DIASTOLIC BLOOD PRESSURE: 56 MMHG

## 2022-08-01 DIAGNOSIS — R09.82 PND (POST-NASAL DRIP): ICD-10-CM

## 2022-08-01 DIAGNOSIS — R09.89 PHLEGM IN THROAT: ICD-10-CM

## 2022-08-01 DIAGNOSIS — H91.93 BILATERAL HEARING LOSS, UNSPECIFIED HEARING LOSS TYPE: ICD-10-CM

## 2022-08-01 DIAGNOSIS — Z97.4 HEARING AID WORN: ICD-10-CM

## 2022-08-01 DIAGNOSIS — K21.9 LARYNGOPHARYNGEAL REFLUX (LPR): Primary | ICD-10-CM

## 2022-08-01 DIAGNOSIS — R09.89 THROAT CLEARING: ICD-10-CM

## 2022-08-01 DIAGNOSIS — J34.2 NASAL SEPTAL DEVIATION: ICD-10-CM

## 2022-08-01 DIAGNOSIS — J30.9 ALLERGIC RHINITIS, UNSPECIFIED SEASONALITY, UNSPECIFIED TRIGGER: ICD-10-CM

## 2022-08-01 DIAGNOSIS — R13.10 DYSPHAGIA, UNSPECIFIED TYPE: ICD-10-CM

## 2022-08-01 PROCEDURE — 99214 PR OFFICE/OUTPT VISIT, EST, LEVL IV, 30-39 MIN: ICD-10-PCS | Mod: 25,S$GLB,, | Performed by: SPECIALIST

## 2022-08-01 PROCEDURE — 3074F SYST BP LT 130 MM HG: CPT | Mod: CPTII,S$GLB,, | Performed by: SPECIALIST

## 2022-08-01 PROCEDURE — 1126F PR PAIN SEVERITY QUANTIFIED, NO PAIN PRESENT: ICD-10-PCS | Mod: CPTII,S$GLB,, | Performed by: SPECIALIST

## 2022-08-01 PROCEDURE — 99999 PR PBB SHADOW E&M-EST. PATIENT-LVL III: ICD-10-PCS | Mod: PBBFAC,,, | Performed by: SPECIALIST

## 2022-08-01 PROCEDURE — 1101F PT FALLS ASSESS-DOCD LE1/YR: CPT | Mod: CPTII,S$GLB,, | Performed by: SPECIALIST

## 2022-08-01 PROCEDURE — 1160F RVW MEDS BY RX/DR IN RCRD: CPT | Mod: CPTII,S$GLB,, | Performed by: SPECIALIST

## 2022-08-01 PROCEDURE — 3008F BODY MASS INDEX DOCD: CPT | Mod: CPTII,S$GLB,, | Performed by: SPECIALIST

## 2022-08-01 PROCEDURE — 3008F PR BODY MASS INDEX (BMI) DOCUMENTED: ICD-10-PCS | Mod: CPTII,S$GLB,, | Performed by: SPECIALIST

## 2022-08-01 PROCEDURE — 3074F PR MOST RECENT SYSTOLIC BLOOD PRESSURE < 130 MM HG: ICD-10-PCS | Mod: CPTII,S$GLB,, | Performed by: SPECIALIST

## 2022-08-01 PROCEDURE — 1159F PR MEDICATION LIST DOCUMENTED IN MEDICAL RECORD: ICD-10-PCS | Mod: CPTII,S$GLB,, | Performed by: SPECIALIST

## 2022-08-01 PROCEDURE — 31575 LARYNGOSCOPY: ICD-10-PCS | Mod: S$GLB,,, | Performed by: SPECIALIST

## 2022-08-01 PROCEDURE — 4010F ACE/ARB THERAPY RXD/TAKEN: CPT | Mod: CPTII,S$GLB,, | Performed by: SPECIALIST

## 2022-08-01 PROCEDURE — 1126F AMNT PAIN NOTED NONE PRSNT: CPT | Mod: CPTII,S$GLB,, | Performed by: SPECIALIST

## 2022-08-01 PROCEDURE — 1160F PR REVIEW ALL MEDS BY PRESCRIBER/CLIN PHARMACIST DOCUMENTED: ICD-10-PCS | Mod: CPTII,S$GLB,, | Performed by: SPECIALIST

## 2022-08-01 PROCEDURE — 1159F MED LIST DOCD IN RCRD: CPT | Mod: CPTII,S$GLB,, | Performed by: SPECIALIST

## 2022-08-01 PROCEDURE — 1101F PR PT FALLS ASSESS DOC 0-1 FALLS W/OUT INJ PAST YR: ICD-10-PCS | Mod: CPTII,S$GLB,, | Performed by: SPECIALIST

## 2022-08-01 PROCEDURE — 31575 DIAGNOSTIC LARYNGOSCOPY: CPT | Mod: S$GLB,,, | Performed by: SPECIALIST

## 2022-08-01 PROCEDURE — 4010F PR ACE/ARB THEARPY RXD/TAKEN: ICD-10-PCS | Mod: CPTII,S$GLB,, | Performed by: SPECIALIST

## 2022-08-01 PROCEDURE — 3288F FALL RISK ASSESSMENT DOCD: CPT | Mod: CPTII,S$GLB,, | Performed by: SPECIALIST

## 2022-08-01 PROCEDURE — 99214 OFFICE O/P EST MOD 30 MIN: CPT | Mod: 25,S$GLB,, | Performed by: SPECIALIST

## 2022-08-01 PROCEDURE — 99999 PR PBB SHADOW E&M-EST. PATIENT-LVL III: CPT | Mod: PBBFAC,,, | Performed by: SPECIALIST

## 2022-08-01 PROCEDURE — 3288F PR FALLS RISK ASSESSMENT DOCUMENTED: ICD-10-PCS | Mod: CPTII,S$GLB,, | Performed by: SPECIALIST

## 2022-08-01 PROCEDURE — 99213 OFFICE O/P EST LOW 20 MIN: CPT | Mod: PBBFAC,PN | Performed by: SPECIALIST

## 2022-08-01 PROCEDURE — 3078F PR MOST RECENT DIASTOLIC BLOOD PRESSURE < 80 MM HG: ICD-10-PCS | Mod: CPTII,S$GLB,, | Performed by: SPECIALIST

## 2022-08-01 PROCEDURE — 3078F DIAST BP <80 MM HG: CPT | Mod: CPTII,S$GLB,, | Performed by: SPECIALIST

## 2022-08-01 RX ORDER — IPRATROPIUM BROMIDE 21 UG/1
2 SPRAY, METERED NASAL 2 TIMES DAILY
Qty: 90 ML | Refills: 3 | Status: SHIPPED | OUTPATIENT
Start: 2022-08-01 | End: 2023-09-26

## 2022-08-01 NOTE — PROCEDURES
"Laryngoscopy    Date/Time: 8/1/2022 3:20 PM  Performed by: BERNARDO Esquivel MD  Authorized by: BERNARDO Esquivel MD     Time out: Immediately prior to procedure a "time out" was called to verify the correct patient, procedure, equipment, support staff and site/side marked as required.    Consent Done?:  Yes (Verbal)  Anesthesia:     Local anesthetic:  4% Xylocaine spray with Benji-Synephrine (Topical aerosol)    Patient tolerance:  Patient tolerated the procedure well with no immediate complications    Decongestion performed?: No    Laryngoscopy:     Areas examined:  Vocal cords, larynx, hypopharynx, oropharynx, nasopharynx and nasal cavities    Laryngoscope size:  4 mm (Flexible video nasolaryngoscopy)  Nose External:      No external nasal deformity  Nose Intranasal:      Mucosa no polyps     Mucosa ulcers not present     No mucosa lesions present (Clear mucus in the nasal passages bilaterally)     Septum gross deformity ( high septal deviation to the right with posterior bony spur to the right, septum deviated on the floor the nose to the)     Enlarged turbinates ( inferior turbinates enlarged bilaterally)  Nasopharynx:      No mucosa lesions     Adenoids not present     Posterior choanae patent     Eustachian tube patent  Larynx/hypopharynx:      No epiglottis lesions     No epiglottis edema     No AE folds lesions     No vocal cord polyps     Equal and normal bilateral ( vocal cords move symmetrically, no mucosal lesions noted)     No hypopharynx lesions     No piriform sinus pooling     No piriform sinus lesions     Post cricoid edema ( mild, minimally increased from last endoscopy)     Post cricoid erythema ( mild, minimally increased from last endoscopy)     Flexible video nasolaryngoscopy-nasal septal deviation and nasal changes allergic rhinitis; laryngeal changes consistent with laryngopharyngeal reflux that is stable on H2 agonist therapy      "

## 2022-08-01 NOTE — PROGRESS NOTES
Subjective:       Patient ID: Pratik Laird is a 67 y.o. male.    Chief Complaint: Follow-up (With scope)    Follow-up    The patient is returning for follow-up visit.  There are multiple issues to discuss:  1. Laryngopharyngeal reflux:  The patient is having significant improvement of symptoms using   famotidine.  He feels that his throat symptoms have improved.  He still continues to get mucus in his throat in the morning have coughing every morning.    2. Allergic rhinitis:  The patient feels that he is having significant postnasal drip that is causing him to have mucus in his throat and coughing every morning.  He uses Claritin on an as-needed basis.    3. Sensorineural hearing loss/hearing aids worn:  He does have hearing aids which he wears routinely.        Review of Systems     Constitutional: Positive for fatigue.  Negative for appetite change and unexpected weight loss.      HENT: Positive for ear pain, postnasal drip, runny nose, sinus pressure, stuffy nose and trouble swallowing.  Negative for ear discharge, ear infection, facial swelling, hearing loss, mouth sores, nosebleeds, ringing in the ears, sinus infection, tonsil infection, dental problems and voice change.      Eyes:  Negative for change in eyesight, eye drainage, eye itching and photophobia.     Respiratory:  Negative for shortness of breath, sleep apnea, snoring and wheezing.      Cardiovascular:  Negative for foot swelling, irregular heartbeat and swollen veins.     Gastrointestinal:  Negative for acid reflux, constipation, diarrhea and heartburn.     Genitourinary: Negative for difficulty urinating and frequent urination.     Musc: Negative for aching joints, aching muscles and back pain.     Allergy: Negative for food allergies and seasonal allergies.     Endocrine: Negative for cold intolerance and heat intolerance.      Neurological: Negative for dizziness, light-headedness, seizures and tremors.      Hematologic: Negative for  bruises/bleeds easily.      Psychiatric: Negative for decreased concentration, depression, nervous/anxious and sleep disturbance.                Objective:      Physical Exam  Vitals and nursing note reviewed.   Constitutional:       General: He is awake.      Appearance: Normal appearance. He is well-developed, well-groomed and overweight.   HENT:      Head: Normocephalic.      Jaw: There is normal jaw occlusion.      Salivary Glands: Right salivary gland is not diffusely enlarged. Left salivary gland is not diffusely enlarged.      Right Ear: Ear canal and external ear normal. Decreased hearing (Wears hearing aid) noted. There is minimal cerumen. Tympanic membrane is retracted. Tympanic membrane has decreased mobility.      Left Ear: Ear canal and external ear normal. Decreased hearing ( wears hearing aid) noted. There is miniml cerumen. Tympanic membrane is retracted. Tympanic membrane has decreased mobility.      Nose: Septal deviation (To the left), mucosal edema (cyanotic, boggy inferior turbinates bilaterally) and rhinorrhea (clear mucus bilaterally) present. No nasal deformity. Rhinorrhea is clear.      Right Turbinates: Enlarged and pale.      Left Turbinates: Enlarged and pale.      Mouth/Throat: Tonsils 3+/4+     Lips: No lesions.      Mouth: Mucous membranes are moist. No oral lesions.      Dentition: No gum lesions.      Tongue: No lesions.      Palate: No mass and lesions.      Pharynx: Oropharynx is clear. Uvula midline.   Eyes:      General: Lids are normal.         Right eye: No discharge.         Left eye: No discharge.      Conjunctiva/sclera:      Right eye: Right conjunctiva is injected. No exudate.     Left eye: Left conjunctiva is injected. No exudate.     Pupils: Pupils are equal, round, and reactive to light.   Neck:      Thyroid: No thyroid mass or thyromegaly.      Trachea: Trachea normal. No tracheal deviation.   Cardiovascular:      Rate and Rhythm: Normal rate and regular rhythm.       Pulses: Normal pulses.      Heart sounds: Normal heart sounds.   Pulmonary:      Effort: Pulmonary effort is normal.      Breath sounds: Normal breath sounds. No stridor. No decreased breath sounds, wheezing, rhonchi or rales.   Abdominal:      General: Bowel sounds are normal.      Palpations: Abdomen is soft.      Tenderness: There is no abdominal tenderness.   Musculoskeletal:         General: Normal range of motion.      Cervical back: Normal range of motion. No muscular tenderness.   Lymphadenopathy:      Head:      Right side of head: No submental, submandibular, preauricular, posterior auricular or occipital adenopathy.      Left side of head: No submental, submandibular, preauricular, posterior auricular or occipital adenopathy.      Cervical: No cervical adenopathy.   Skin:     General: Skin is warm and dry.      Findings: No petechiae or rash.      Nails: There is no clubbing.   Neurological:      Mental Status: He is alert and oriented to person, place, and time.      Cranial Nerves: No cranial nerve deficit.      Sensory: No sensory deficit.      Gait: Gait normal.   Psychiatric:         Speech: Speech normal.         Behavior: Behavior normal. Behavior is cooperative.         Thought Content: Thought content normal.         Judgment: Judgment normal.         Flexible video nasolaryngoscopy-nasal septal deviation and nasal changes of allergic rhinitis; laryngeal changes consistent with laryngopharyngeal reflux that is stable on H2 agonist therapy.    Assessment:       1. Laryngopharyngeal reflux (LPR)    2. Dysphagia, unspecified type    3. Throat clearing    4. Allergic rhinitis, unspecified seasonality, unspecified trigger    5. PND (post-nasal drip)    6. Nasal septal deviation    7. Bilateral hearing loss, unspecified hearing loss type    8. Hearing aid worn    9. Phlegm in throat        Plan:       I  will have the patient continue with the famotidine twice daily.  I am adding ipratropium bromide  spray to be use every night and during the day if needed to the gue[facility]a Ques[facility].  I will recheck him in 3 months.

## 2022-11-15 ENCOUNTER — OFFICE VISIT (OUTPATIENT)
Dept: OTOLARYNGOLOGY | Facility: CLINIC | Age: 67
End: 2022-11-15
Payer: MEDICARE

## 2022-11-15 VITALS
WEIGHT: 241.31 LBS | BODY MASS INDEX: 35.63 KG/M2 | DIASTOLIC BLOOD PRESSURE: 63 MMHG | TEMPERATURE: 98 F | HEART RATE: 63 BPM | SYSTOLIC BLOOD PRESSURE: 100 MMHG

## 2022-11-15 DIAGNOSIS — Z97.4 HEARING AID WORN: ICD-10-CM

## 2022-11-15 DIAGNOSIS — R09.82 PND (POST-NASAL DRIP): ICD-10-CM

## 2022-11-15 DIAGNOSIS — J30.9 ALLERGIC RHINITIS, UNSPECIFIED SEASONALITY, UNSPECIFIED TRIGGER: Primary | ICD-10-CM

## 2022-11-15 DIAGNOSIS — R13.10 DYSPHAGIA, UNSPECIFIED TYPE: ICD-10-CM

## 2022-11-15 DIAGNOSIS — K21.9 LARYNGOPHARYNGEAL REFLUX (LPR): ICD-10-CM

## 2022-11-15 DIAGNOSIS — H91.93 BILATERAL HEARING LOSS, UNSPECIFIED HEARING LOSS TYPE: ICD-10-CM

## 2022-11-15 PROCEDURE — 3008F PR BODY MASS INDEX (BMI) DOCUMENTED: ICD-10-PCS | Mod: CPTII,S$GLB,, | Performed by: SPECIALIST

## 2022-11-15 PROCEDURE — 3078F DIAST BP <80 MM HG: CPT | Mod: CPTII,S$GLB,, | Performed by: SPECIALIST

## 2022-11-15 PROCEDURE — 1101F PR PT FALLS ASSESS DOC 0-1 FALLS W/OUT INJ PAST YR: ICD-10-PCS | Mod: CPTII,S$GLB,, | Performed by: SPECIALIST

## 2022-11-15 PROCEDURE — 1159F PR MEDICATION LIST DOCUMENTED IN MEDICAL RECORD: ICD-10-PCS | Mod: CPTII,S$GLB,, | Performed by: SPECIALIST

## 2022-11-15 PROCEDURE — 4010F PR ACE/ARB THEARPY RXD/TAKEN: ICD-10-PCS | Mod: CPTII,S$GLB,, | Performed by: SPECIALIST

## 2022-11-15 PROCEDURE — 99213 PR OFFICE/OUTPT VISIT, EST, LEVL III, 20-29 MIN: ICD-10-PCS | Mod: S$GLB,,, | Performed by: SPECIALIST

## 2022-11-15 PROCEDURE — 1125F PR PAIN SEVERITY QUANTIFIED, PAIN PRESENT: ICD-10-PCS | Mod: CPTII,S$GLB,, | Performed by: SPECIALIST

## 2022-11-15 PROCEDURE — 99999 PR PBB SHADOW E&M-EST. PATIENT-LVL III: CPT | Mod: PBBFAC,,, | Performed by: SPECIALIST

## 2022-11-15 PROCEDURE — 1160F RVW MEDS BY RX/DR IN RCRD: CPT | Mod: CPTII,S$GLB,, | Performed by: SPECIALIST

## 2022-11-15 PROCEDURE — 3288F PR FALLS RISK ASSESSMENT DOCUMENTED: ICD-10-PCS | Mod: CPTII,S$GLB,, | Performed by: SPECIALIST

## 2022-11-15 PROCEDURE — 99213 OFFICE O/P EST LOW 20 MIN: CPT | Mod: S$GLB,,, | Performed by: SPECIALIST

## 2022-11-15 PROCEDURE — 3074F PR MOST RECENT SYSTOLIC BLOOD PRESSURE < 130 MM HG: ICD-10-PCS | Mod: CPTII,S$GLB,, | Performed by: SPECIALIST

## 2022-11-15 PROCEDURE — 1101F PT FALLS ASSESS-DOCD LE1/YR: CPT | Mod: CPTII,S$GLB,, | Performed by: SPECIALIST

## 2022-11-15 PROCEDURE — 4010F ACE/ARB THERAPY RXD/TAKEN: CPT | Mod: CPTII,S$GLB,, | Performed by: SPECIALIST

## 2022-11-15 PROCEDURE — 1160F PR REVIEW ALL MEDS BY PRESCRIBER/CLIN PHARMACIST DOCUMENTED: ICD-10-PCS | Mod: CPTII,S$GLB,, | Performed by: SPECIALIST

## 2022-11-15 PROCEDURE — 1125F AMNT PAIN NOTED PAIN PRSNT: CPT | Mod: CPTII,S$GLB,, | Performed by: SPECIALIST

## 2022-11-15 PROCEDURE — 3008F BODY MASS INDEX DOCD: CPT | Mod: CPTII,S$GLB,, | Performed by: SPECIALIST

## 2022-11-15 PROCEDURE — 3078F PR MOST RECENT DIASTOLIC BLOOD PRESSURE < 80 MM HG: ICD-10-PCS | Mod: CPTII,S$GLB,, | Performed by: SPECIALIST

## 2022-11-15 PROCEDURE — 3074F SYST BP LT 130 MM HG: CPT | Mod: CPTII,S$GLB,, | Performed by: SPECIALIST

## 2022-11-15 PROCEDURE — 1159F MED LIST DOCD IN RCRD: CPT | Mod: CPTII,S$GLB,, | Performed by: SPECIALIST

## 2022-11-15 PROCEDURE — 99999 PR PBB SHADOW E&M-EST. PATIENT-LVL III: ICD-10-PCS | Mod: PBBFAC,,, | Performed by: SPECIALIST

## 2022-11-15 PROCEDURE — 3288F FALL RISK ASSESSMENT DOCD: CPT | Mod: CPTII,S$GLB,, | Performed by: SPECIALIST

## 2022-11-15 NOTE — PROGRESS NOTES
"Subjective:       Patient ID: Pratik Laird is a 67 y.o. male.    Chief Complaint: Follow-up and Sore Throat    Follow-up    The patient is returning for follow-up visit.  There are multiple issues to discuss:  1. Laryngopharyngeal reflux:  The patient  is having minimal symptoms from his laryngopharyngeal reflux.  He is taking famotidine 20 mg twice daily.  As long as he follows his diet he has no significant problems.    2. Allergic rhinitis:     He has recently had increased congestion and pressure in his sinuses.  He is having postnasal drip that is causing him some sore throat.  Secretions are clear.  He does not have fever.  He takes loratadine daily and supplement with ipratropium bromide when needed "if he remembers to do so".  3. Sensorineural hearing loss/hearing aids worn:  He does have hearing aids which he wears routinely.        Review of Systems     Constitutional: Positive for fatigue.  Negative for appetite change and unexpected weight loss.      HENT: Positive for ear pain, postnasal drip, runny nose, sinus pressure, stuffy nose and trouble swallowing.  Negative for ear discharge, ear infection, facial swelling, hearing loss, mouth sores, nosebleeds, ringing in the ears, sinus infection, tonsil infection, dental problems and voice change.      Eyes:  Negative for change in eyesight, eye drainage, eye itching and photophobia.     Respiratory:  Negative for shortness of breath, sleep apnea, snoring and wheezing.      Cardiovascular:  Negative for foot swelling, irregular heartbeat and swollen veins.     Gastrointestinal:  Negative for acid reflux, constipation, diarrhea and heartburn.     Genitourinary: Negative for difficulty urinating and frequent urination.     Musc: Negative for aching joints, aching muscles and back pain.     Allergy: Negative for food allergies and seasonal allergies.     Endocrine: Negative for cold intolerance and heat intolerance.      Neurological: Negative for " dizziness, light-headedness, seizures and tremors.      Hematologic: Negative for bruises/bleeds easily.      Psychiatric: Negative for decreased concentration, depression, nervous/anxious and sleep disturbance.                Objective:      Physical Exam  Vitals and nursing note reviewed.   Constitutional:       General: He is awake.      Appearance: Normal appearance. He is well-developed, well-groomed and overweight.   HENT:      Head: Normocephalic.      Jaw: There is normal jaw occlusion.      Salivary Glands: Right salivary gland is not diffusely enlarged. Left salivary gland is not diffusely enlarged.      Right Ear: Ear canal and external ear normal. Decreased hearing (Wears hearing aid) noted. There is minimal cerumen. Tympanic membrane is retracted. Tympanic membrane has decreased mobility.      Left Ear: Ear canal and external ear normal. Decreased hearing ( wears hearing aid) noted. There is miniml cerumen. Tympanic membrane is retracted. Tympanic membrane has decreased mobility.      Nose: Septal deviation (To the left), mucosal edema (cyanotic, boggy inferior turbinates bilaterally) and rhinorrhea (clear mucus bilaterally) present. No nasal deformity. Rhinorrhea is clear.      Right Turbinates: Enlarged and pale.      Left Turbinates: Enlarged and pale.      Mouth/Throat: Tonsils 3+/4+     Lips: No lesions.      Mouth: Mucous membranes are moist. No oral lesions.      Dentition: No gum lesions.      Tongue: No lesions.      Palate: No mass and lesions.      Pharynx: Oropharynx is clear. Uvula midline.   Eyes:      General: Lids are normal.         Right eye: No discharge.         Left eye: No discharge.      Conjunctiva/sclera:      Right eye: Right conjunctiva is injected. No exudate.     Left eye: Left conjunctiva is injected. No exudate.     Pupils: Pupils are equal, round, and reactive to light.   Neck:      Thyroid: No thyroid mass or thyromegaly.      Trachea: Trachea normal. No tracheal  deviation.   Cardiovascular:      Rate and Rhythm: Normal rate and regular rhythm.      Pulses: Normal pulses.      Heart sounds: Normal heart sounds.   Pulmonary:      Effort: Pulmonary effort is normal.      Breath sounds: Normal breath sounds. No stridor. No decreased breath sounds, wheezing, rhonchi or rales.   Abdominal:      General: Bowel sounds are normal.      Palpations: Abdomen is soft.      Tenderness: There is no abdominal tenderness.   Musculoskeletal:         General: Normal range of motion.      Cervical back: Normal range of motion. No muscular tenderness.   Lymphadenopathy:      Head:      Right side of head: No submental, submandibular, preauricular, posterior auricular or occipital adenopathy.      Left side of head: No submental, submandibular, preauricular, posterior auricular or occipital adenopathy.      Cervical: No cervical adenopathy.   Skin:     General: Skin is warm and dry.      Findings: No petechiae or rash.      Nails: There is no clubbing.   Neurological:      Mental Status: He is alert and oriented to person, place, and time.      Cranial Nerves: No cranial nerve deficit.      Sensory: No sensory deficit.      Gait: Gait normal.   Psychiatric:         Speech: Speech normal.         Behavior: Behavior normal. Behavior is cooperative.         Thought Content: Thought content normal.         Judgment: Judgment normal.              Assessment:       1. Allergic rhinitis, unspecified seasonality, unspecified trigger    2. PND (post-nasal drip)    3. Laryngopharyngeal reflux (LPR)    4. Dysphagia, unspecified type    5. Bilateral hearing loss, unspecified hearing loss type    6. Hearing aid worn          Plan:       I   will have the patient continue with his present drug regimen for his allergies.  I will recheck him in 3 months.  He will need undergo flexible nasolaryngoscopy at that visit.

## 2023-02-17 ENCOUNTER — OFFICE VISIT (OUTPATIENT)
Dept: OTOLARYNGOLOGY | Facility: CLINIC | Age: 68
End: 2023-02-17
Payer: MEDICARE

## 2023-02-17 VITALS
SYSTOLIC BLOOD PRESSURE: 99 MMHG | TEMPERATURE: 98 F | BODY MASS INDEX: 35.52 KG/M2 | DIASTOLIC BLOOD PRESSURE: 55 MMHG | WEIGHT: 240.5 LBS | HEART RATE: 63 BPM

## 2023-02-17 DIAGNOSIS — H91.93 BILATERAL HEARING LOSS, UNSPECIFIED HEARING LOSS TYPE: ICD-10-CM

## 2023-02-17 DIAGNOSIS — K21.9 LARYNGOPHARYNGEAL REFLUX (LPR): Primary | ICD-10-CM

## 2023-02-17 DIAGNOSIS — R13.10 DYSPHAGIA, UNSPECIFIED TYPE: ICD-10-CM

## 2023-02-17 DIAGNOSIS — J30.9 ALLERGIC RHINITIS, UNSPECIFIED SEASONALITY, UNSPECIFIED TRIGGER: ICD-10-CM

## 2023-02-17 DIAGNOSIS — J34.2 NASAL SEPTAL DEVIATION: ICD-10-CM

## 2023-02-17 DIAGNOSIS — R09.82 PND (POST-NASAL DRIP): ICD-10-CM

## 2023-02-17 DIAGNOSIS — Z97.4 HEARING AID WORN: ICD-10-CM

## 2023-02-17 PROCEDURE — 1160F PR REVIEW ALL MEDS BY PRESCRIBER/CLIN PHARMACIST DOCUMENTED: ICD-10-PCS | Mod: CPTII,S$GLB,, | Performed by: SPECIALIST

## 2023-02-17 PROCEDURE — 31575 LARYNGOSCOPY: ICD-10-PCS | Mod: S$GLB,,, | Performed by: SPECIALIST

## 2023-02-17 PROCEDURE — 1101F PR PT FALLS ASSESS DOC 0-1 FALLS W/OUT INJ PAST YR: ICD-10-PCS | Mod: CPTII,S$GLB,, | Performed by: SPECIALIST

## 2023-02-17 PROCEDURE — 3288F FALL RISK ASSESSMENT DOCD: CPT | Mod: CPTII,S$GLB,, | Performed by: SPECIALIST

## 2023-02-17 PROCEDURE — 3078F PR MOST RECENT DIASTOLIC BLOOD PRESSURE < 80 MM HG: ICD-10-PCS | Mod: CPTII,S$GLB,, | Performed by: SPECIALIST

## 2023-02-17 PROCEDURE — 99214 PR OFFICE/OUTPT VISIT, EST, LEVL IV, 30-39 MIN: ICD-10-PCS | Mod: 25,S$GLB,, | Performed by: SPECIALIST

## 2023-02-17 PROCEDURE — 1160F RVW MEDS BY RX/DR IN RCRD: CPT | Mod: CPTII,S$GLB,, | Performed by: SPECIALIST

## 2023-02-17 PROCEDURE — 99999 PR PBB SHADOW E&M-EST. PATIENT-LVL IV: ICD-10-PCS | Mod: PBBFAC,,, | Performed by: SPECIALIST

## 2023-02-17 PROCEDURE — 3074F PR MOST RECENT SYSTOLIC BLOOD PRESSURE < 130 MM HG: ICD-10-PCS | Mod: CPTII,S$GLB,, | Performed by: SPECIALIST

## 2023-02-17 PROCEDURE — 1101F PT FALLS ASSESS-DOCD LE1/YR: CPT | Mod: CPTII,S$GLB,, | Performed by: SPECIALIST

## 2023-02-17 PROCEDURE — 99214 OFFICE O/P EST MOD 30 MIN: CPT | Mod: 25,S$GLB,, | Performed by: SPECIALIST

## 2023-02-17 PROCEDURE — 3008F PR BODY MASS INDEX (BMI) DOCUMENTED: ICD-10-PCS | Mod: CPTII,S$GLB,, | Performed by: SPECIALIST

## 2023-02-17 PROCEDURE — 3288F PR FALLS RISK ASSESSMENT DOCUMENTED: ICD-10-PCS | Mod: CPTII,S$GLB,, | Performed by: SPECIALIST

## 2023-02-17 PROCEDURE — 3078F DIAST BP <80 MM HG: CPT | Mod: CPTII,S$GLB,, | Performed by: SPECIALIST

## 2023-02-17 PROCEDURE — 1159F PR MEDICATION LIST DOCUMENTED IN MEDICAL RECORD: ICD-10-PCS | Mod: CPTII,S$GLB,, | Performed by: SPECIALIST

## 2023-02-17 PROCEDURE — 4010F PR ACE/ARB THEARPY RXD/TAKEN: ICD-10-PCS | Mod: CPTII,S$GLB,, | Performed by: SPECIALIST

## 2023-02-17 PROCEDURE — 4010F ACE/ARB THERAPY RXD/TAKEN: CPT | Mod: CPTII,S$GLB,, | Performed by: SPECIALIST

## 2023-02-17 PROCEDURE — 1159F MED LIST DOCD IN RCRD: CPT | Mod: CPTII,S$GLB,, | Performed by: SPECIALIST

## 2023-02-17 PROCEDURE — 99999 PR PBB SHADOW E&M-EST. PATIENT-LVL IV: CPT | Mod: PBBFAC,,, | Performed by: SPECIALIST

## 2023-02-17 PROCEDURE — 3008F BODY MASS INDEX DOCD: CPT | Mod: CPTII,S$GLB,, | Performed by: SPECIALIST

## 2023-02-17 PROCEDURE — 3074F SYST BP LT 130 MM HG: CPT | Mod: CPTII,S$GLB,, | Performed by: SPECIALIST

## 2023-02-17 PROCEDURE — 1126F PR PAIN SEVERITY QUANTIFIED, NO PAIN PRESENT: ICD-10-PCS | Mod: CPTII,S$GLB,, | Performed by: SPECIALIST

## 2023-02-17 PROCEDURE — 1126F AMNT PAIN NOTED NONE PRSNT: CPT | Mod: CPTII,S$GLB,, | Performed by: SPECIALIST

## 2023-02-17 PROCEDURE — 31575 DIAGNOSTIC LARYNGOSCOPY: CPT | Mod: S$GLB,,, | Performed by: SPECIALIST

## 2023-02-17 NOTE — PROGRESS NOTES
"Subjective:       Patient ID: Pratik Laird is a 67 y.o. male.    Chief Complaint: Follow-up      Follow-up    The patient is returning for follow-up visit.  There are multiple issues to discuss:  1. Laryngopharyngeal reflux:   He is taking famotidine 20 mg twice daily.  As long as he follows his diet he has no significant problems.    2. Allergic rhinitis:       He takes loratadine daily and supplement with ipratropium bromide when needed "if he remembers to do so".  3. Sensorineural hearing loss/hearing aids worn:  He does have hearing aids which he wears routinely.        Review of Systems     Constitutional: Positive for fatigue.  Negative for appetite change and unexpected weight loss.      HENT: Positive for ear pain, postnasal drip, runny nose, sinus pressure, stuffy nose and trouble swallowing.  Negative for ear discharge, ear infection, facial swelling, hearing loss, mouth sores, nosebleeds, ringing in the ears, sinus infection, tonsil infection, dental problems and voice change.      Eyes:  Negative for change in eyesight, eye drainage, eye itching and photophobia.     Respiratory:  Negative for shortness of breath, sleep apnea, snoring and wheezing.      Cardiovascular:  Negative for foot swelling, irregular heartbeat and swollen veins.     Gastrointestinal:  Negative for acid reflux, constipation, diarrhea and heartburn.     Genitourinary: Negative for difficulty urinating and frequent urination.     Musc: Negative for aching joints, aching muscles and back pain.     Allergy: Negative for food allergies and seasonal allergies.     Endocrine: Negative for cold intolerance and heat intolerance.      Neurological: Negative for dizziness, light-headedness, seizures and tremors.      Hematologic: Negative for bruises/bleeds easily.      Psychiatric: Negative for decreased concentration, depression, nervous/anxious and sleep disturbance.                Objective:      Physical Exam  Vitals and nursing " note reviewed.   Constitutional:       General: He is awake.      Appearance: Normal appearance. He is well-developed, well-groomed and overweight.   HENT:      Head: Normocephalic.      Jaw: There is normal jaw occlusion.      Salivary Glands: Right salivary gland is not diffusely enlarged. Left salivary gland is not diffusely enlarged.      Right Ear: Ear canal and external ear normal. Decreased hearing (Wears hearing aid) noted. There is minimal cerumen. Tympanic membrane is retracted. Tympanic membrane has decreased mobility.      Left Ear: Ear canal and external ear normal. Decreased hearing ( wears hearing aid) noted. There is miniml cerumen. Tympanic membrane is retracted. Tympanic membrane has decreased mobility.      Nose: Septal deviation (To the left), mucosal edema (cyanotic, boggy inferior turbinates bilaterally) and rhinorrhea (clear mucus bilaterally) present. No nasal deformity. Rhinorrhea is clear.      Right Turbinates: Enlarged and pale.      Left Turbinates: Enlarged and pale.      Mouth/Throat: Tonsils 3+/4+     Lips: No lesions.      Mouth: Mucous membranes are moist. No oral lesions.      Dentition: No gum lesions.      Tongue: No lesions.      Palate: No mass and lesions.      Pharynx: Oropharynx is clear. Uvula midline.   Eyes:      General: Lids are normal.         Right eye: No discharge.         Left eye: No discharge.      Conjunctiva/sclera:      Right eye: Right conjunctiva is injected. No exudate.     Left eye: Left conjunctiva is injected. No exudate.     Pupils: Pupils are equal, round, and reactive to light.   Neck:      Thyroid: No thyroid mass or thyromegaly.      Trachea: Trachea normal. No tracheal deviation.   Cardiovascular:      Rate and Rhythm: Normal rate and regular rhythm.      Pulses: Normal pulses.      Heart sounds: Normal heart sounds.   Pulmonary:      Effort: Pulmonary effort is normal.      Breath sounds: Normal breath sounds. No stridor. No decreased breath  sounds, wheezing, rhonchi or rales.   Abdominal:      General: Bowel sounds are normal.      Palpations: Abdomen is soft.      Tenderness: There is no abdominal tenderness.   Musculoskeletal:         General: Normal range of motion.      Cervical back: Normal range of motion. No muscular tenderness.   Lymphadenopathy:      Head:      Right side of head: No submental, submandibular, preauricular, posterior auricular or occipital adenopathy.      Left side of head: No submental, submandibular, preauricular, posterior auricular or occipital adenopathy.      Cervical: No cervical adenopathy.   Skin:     General: Skin is warm and dry.      Findings: No petechiae or rash.      Nails: There is no clubbing.   Neurological:      Mental Status: He is alert and oriented to person, place, and time.      Cranial Nerves: No cranial nerve deficit.      Sensory: No sensory deficit.      Gait: Gait normal.   Psychiatric:         Speech: Speech normal.         Behavior: Behavior normal. Behavior is cooperative.         Thought Content: Thought content normal.         Judgment: Judgment normal.         Flexible video nasolaryngoscopy-nasal septal deviation and nasal changes allergic rhinitis; laryngeal changes consistent with laryngopharyngeal reflux that is improving on H2 agonist therapy.    Assessment:       1. Laryngopharyngeal reflux (LPR)    2. Dysphagia, unspecified type    3. Allergic rhinitis, unspecified seasonality, unspecified trigger    4. PND (post-nasal drip)    5. Bilateral hearing loss, unspecified hearing loss type    6. Hearing aid worn    7. Nasal septal deviation          Plan:       I

## 2023-02-17 NOTE — PROCEDURES
"Laryngoscopy    Date/Time: 2/17/2023 1:40 PM  Performed by: BERNARDO Esquivel MD  Authorized by: BERNARDO Esquivel MD     Time out: Immediately prior to procedure a "time out" was called to verify the correct patient, procedure, equipment, support staff and site/side marked as required.    Consent Done?:  Yes (Verbal)  Anesthesia:     Local anesthetic:  4% Xylocaine spray with Benji-Synephrine    Patient tolerance:  Patient tolerated the procedure well with no immediate complications    Decongestion performed?: Yes    Laryngoscopy:     Areas examined:  Larynx, hypopharynx, oropharynx, nasopharynx, nasal cavities and vocal cords    Laryngoscope size:  4 mm (Flexible video nasolaryngoscopy)  Nose External:      No external nasal deformity  Nose Intranasal:      Mucosa no polyps     Mucosa ulcers not present     No mucosa lesions present (clear mucus in the nasal passages bilaterally)     Septum gross deformity (septum deviated high to the right and on the floor the nose to the left with a posterior bony spur on the right)     Enlarged turbinates (inferior turbinates enlarged bilaterally)  Nasopharynx:      No mucosa lesions     Adenoids not present     Posterior choanae patent     Eustachian tube patent  Larynx/hypopharynx:      No epiglottis lesions     No epiglottis edema     No AE folds lesions     No vocal cord polyps     Equal and normal bilateral (vocal cords move symmetrically, no mucosal lesions noted)     No hypopharynx lesions     No piriform sinus pooling     No piriform sinus lesions     Post cricoid edema (minimal, improved from last endoscopy)     Post cricoid erythema (minimal, improved from last endoscopy)     Flexible video nasolaryngoscopy-nasal septal deviation and nasal changes allergic rhinitis; laryngeal changes consistent with laryngopharyngeal reflux that is improving on H2 agonist therapy  " Doxycycline Counseling:  Patient counseled regarding possible photosensitivity and increased risk for sunburn.  Patient instructed to avoid sunlight, if possible.  When exposed to sunlight, patients should wear protective clothing, sunglasses, and sunscreen.  The patient was instructed to call the office immediately if the following severe adverse effects occur:  hearing changes, easy bruising/bleeding, severe headache, or vision changes.  The patient verbalized understanding of the proper use and possible adverse effects of doxycycline.  All of the patient's questions and concerns were addressed.

## 2023-02-18 NOTE — PROGRESS NOTES
Subjective:       Patient ID: Pratik Laird is a 67 y.o. male.    Chief Complaint: Follow-up    The patient is returning for follow-up visit.  There are multiple issues to discuss:  1. Laryngopharyngeal reflux:  The patient does well as long as he follows his anti-reflux diet.  He is taking famotidine twice daily.  2. Allergic rhinitis:  He is having some nasal congestion and clear nasal discharge.  He is using loratadine and fluticasone nasal sprays on a daily basis.  3. Sensorineural hearing loss/wears hearing aids:  He wears hearing aids in both ears.        Review of Systems     Constitutional: Positive for fatigue.  Negative for appetite change, chills, fever and unexpected weight loss.      HENT: Positive for hearing loss (wears hearing aids bilaterally), postnasal drip, runny nose, sinus pressure, sore throat, stuffy nose and trouble swallowing.  Negative for ear discharge, ear infection, ear pain, facial swelling, mouth sores, nosebleeds, ringing in the ears, sinus infection, tonsil infection, dental problems and voice change.      Eyes:  Negative for change in eyesight, eye drainage, eye itching and photophobia.     Respiratory:  Positive for cough. Negative for shortness of breath, sleep apnea, snoring and wheezing.      Cardiovascular:  Negative for chest pain, foot swelling, irregular heartbeat and swollen veins.     Gastrointestinal:  Negative for abdominal pain, acid reflux, constipation, diarrhea, heartburn and vomiting.     Genitourinary: Negative for difficulty urinating, sexual problems and frequent urination.     Musc: Negative for aching joints, aching muscles, back pain and neck pain.     Skin: Negative for rash.     Allergy: Positive for seasonal allergies. Negative for food allergies.     Endocrine: Negative for cold intolerance and heat intolerance.      Neurological: Positive for headaches. Negative for dizziness, light-headedness, seizures and tremors.      Hematologic: Negative for  bruises/bleeds easily.      Psychiatric: Negative for decreased concentration, depression, nervous/anxious and sleep disturbance.              Objective:      Physical Exam  Vitals and nursing note reviewed.   Constitutional:       General: He is awake.      Appearance: Normal appearance. He is well-developed, well-groomed and overweight.   HENT:      Head: Normocephalic.      Jaw: There is normal jaw occlusion.      Salivary Glands: Right salivary gland is not diffusely enlarged or tender. Left salivary gland is not diffusely enlarged or tender.      Right Ear: Ear canal and external ear normal. Decreased hearing noted. Tympanic membrane is retracted.      Left Ear: Ear canal and external ear normal. Decreased hearing noted. Tympanic membrane is retracted.      Nose: Septal deviation (to the left), mucosal edema (cyanotic, boggy inferior turbinates bilaterally) and rhinorrhea (clear mucus bilaterally) present. No nasal deformity. Rhinorrhea is clear.      Right Turbinates: Enlarged and pale.      Left Turbinates: Enlarged and pale.      Mouth/Throat:      Lips: No lesions.      Mouth: Mucous membranes are moist. No oral lesions.      Dentition: No gum lesions.      Tongue: No lesions.      Palate: No mass and lesions.      Pharynx: Oropharynx is clear. Uvula midline.      Tonsils: 3+ on the right. 3+ on the left.   Eyes:      General: Lids are normal.         Right eye: No discharge.         Left eye: No discharge.      Conjunctiva/sclera:      Right eye: Right conjunctiva is injected. No exudate.     Left eye: Left conjunctiva is injected. No exudate.     Pupils: Pupils are equal, round, and reactive to light.   Neck:      Thyroid: No thyroid mass or thyromegaly.      Trachea: Trachea normal. No tracheal deviation.   Cardiovascular:      Rate and Rhythm: Normal rate and regular rhythm.      Pulses: Normal pulses.      Heart sounds: Normal heart sounds.   Pulmonary:      Effort: Pulmonary effort is normal.       Breath sounds: Normal breath sounds. No stridor. No decreased breath sounds, wheezing, rhonchi or rales.   Abdominal:      General: Bowel sounds are normal.      Palpations: Abdomen is soft.      Tenderness: There is no abdominal tenderness.   Musculoskeletal:      Cervical back: Neck supple. No muscular tenderness. Decreased range of motion.   Lymphadenopathy:      Head:      Right side of head: No submental, submandibular, preauricular, posterior auricular or occipital adenopathy.      Left side of head: No submental, submandibular, preauricular, posterior auricular or occipital adenopathy.      Cervical: No cervical adenopathy.   Skin:     General: Skin is warm and dry.      Findings: No petechiae or rash.      Nails: There is no clubbing.   Neurological:      Mental Status: He is alert and oriented to person, place, and time.      Cranial Nerves: No cranial nerve deficit.      Sensory: No sensory deficit.      Gait: Gait normal.   Psychiatric:         Speech: Speech normal.         Behavior: Behavior normal. Behavior is cooperative.         Thought Content: Thought content normal.         Judgment: Judgment normal.       Flexible video nasolaryngoscopy-nasal septal deviation and nasal changes allergic rhinitis; laryngeal changes consistent with laryngopharyngeal reflux that is improving on b.i.d. H2 agonist therapy    Assessment:       1. Laryngopharyngeal reflux (LPR)    2. Dysphagia, unspecified type    3. Allergic rhinitis, unspecified seasonality, unspecified trigger    4. PND (post-nasal drip)    5. Bilateral hearing loss, unspecified hearing loss type    6. Hearing aid worn    7. Nasal septal deviation          Plan:       I will have the patient continue with his present drug regimen and will recheck him in 3 months, or sooner on an as-needed basis.

## 2023-05-19 ENCOUNTER — OFFICE VISIT (OUTPATIENT)
Dept: OTOLARYNGOLOGY | Facility: CLINIC | Age: 68
End: 2023-05-19
Payer: MEDICARE

## 2023-05-19 VITALS — WEIGHT: 242.63 LBS | BODY MASS INDEX: 35.83 KG/M2

## 2023-05-19 DIAGNOSIS — J34.2 NASAL SEPTAL DEVIATION: ICD-10-CM

## 2023-05-19 DIAGNOSIS — K21.9 LARYNGOPHARYNGEAL REFLUX (LPR): Primary | ICD-10-CM

## 2023-05-19 DIAGNOSIS — J30.9 ALLERGIC RHINITIS, UNSPECIFIED SEASONALITY, UNSPECIFIED TRIGGER: ICD-10-CM

## 2023-05-19 DIAGNOSIS — R09.82 PND (POST-NASAL DRIP): ICD-10-CM

## 2023-05-19 DIAGNOSIS — Z97.4 HEARING AID WORN: ICD-10-CM

## 2023-05-19 DIAGNOSIS — H91.93 BILATERAL HEARING LOSS, UNSPECIFIED HEARING LOSS TYPE: ICD-10-CM

## 2023-05-19 DIAGNOSIS — R13.10 DYSPHAGIA, UNSPECIFIED TYPE: ICD-10-CM

## 2023-05-19 PROCEDURE — 4010F ACE/ARB THERAPY RXD/TAKEN: CPT | Mod: CPTII,,, | Performed by: SPECIALIST

## 2023-05-19 PROCEDURE — 1159F PR MEDICATION LIST DOCUMENTED IN MEDICAL RECORD: ICD-10-PCS | Mod: CPTII,,, | Performed by: SPECIALIST

## 2023-05-19 PROCEDURE — 99213 PR OFFICE/OUTPT VISIT, EST, LEVL III, 20-29 MIN: ICD-10-PCS | Mod: ,,, | Performed by: SPECIALIST

## 2023-05-19 PROCEDURE — 1159F MED LIST DOCD IN RCRD: CPT | Mod: CPTII,,, | Performed by: SPECIALIST

## 2023-05-19 PROCEDURE — 3008F PR BODY MASS INDEX (BMI) DOCUMENTED: ICD-10-PCS | Mod: CPTII,,, | Performed by: SPECIALIST

## 2023-05-19 PROCEDURE — 99213 OFFICE O/P EST LOW 20 MIN: CPT | Mod: ,,, | Performed by: SPECIALIST

## 2023-05-19 PROCEDURE — 99211 OFF/OP EST MAY X REQ PHY/QHP: CPT | Mod: PN | Performed by: SPECIALIST

## 2023-05-19 PROCEDURE — 99999 PR PBB SHADOW E&M-EST. PATIENT-LVL I: ICD-10-PCS | Mod: PBBFAC,,, | Performed by: SPECIALIST

## 2023-05-19 PROCEDURE — 99999 PR PBB SHADOW E&M-EST. PATIENT-LVL I: CPT | Mod: PBBFAC,,, | Performed by: SPECIALIST

## 2023-05-19 PROCEDURE — 1160F PR REVIEW ALL MEDS BY PRESCRIBER/CLIN PHARMACIST DOCUMENTED: ICD-10-PCS | Mod: CPTII,,, | Performed by: SPECIALIST

## 2023-05-19 PROCEDURE — 4010F PR ACE/ARB THEARPY RXD/TAKEN: ICD-10-PCS | Mod: CPTII,,, | Performed by: SPECIALIST

## 2023-05-19 PROCEDURE — 3008F BODY MASS INDEX DOCD: CPT | Mod: CPTII,,, | Performed by: SPECIALIST

## 2023-05-19 PROCEDURE — 1160F RVW MEDS BY RX/DR IN RCRD: CPT | Mod: CPTII,,, | Performed by: SPECIALIST

## 2023-05-19 NOTE — PROGRESS NOTES
Subjective:       Patient ID: Pratik Laird is a 68 y.o. male.    Chief Complaint: No chief complaint on file.    The patient is returning for follow-up visit.  There are multiple issues to discuss:  1. Laryngopharyngeal reflux:  The patient has infrequent phlegm in the throat but no other symptoms as long as he follows his diet.  He is taking famotidine twice daily.  2. Allergic rhinitis:  He is having some nasal congestion and clear nasal discharge.  He is using loratadine on a daily basis and supplements with ipratropium bromide nasal spray when needed.    3. Sensorineural hearing loss/wears hearing aids:  He wears hearing aids in both ears.        Review of Systems     Constitutional: Positive for fatigue.  Negative for appetite change, chills, fever and unexpected weight loss.      HENT: Positive for hearing loss (wears hearing aids bilaterally), postnasal drip, runny nose, sinus pressure, sore throat, stuffy nose and trouble swallowing.  Negative for ear discharge, ear infection, ear pain, facial swelling, mouth sores, nosebleeds, ringing in the ears, sinus infection, tonsil infection, dental problems and voice change.      Eyes:  Negative for change in eyesight, eye drainage, eye itching and photophobia.     Respiratory:  Positive for cough. Negative for shortness of breath, sleep apnea, snoring and wheezing.      Cardiovascular:  Negative for chest pain, foot swelling, irregular heartbeat and swollen veins.     Gastrointestinal:  Negative for abdominal pain, acid reflux, constipation, diarrhea, heartburn and vomiting.     Genitourinary: Negative for difficulty urinating, sexual problems and frequent urination.     Musc: Negative for aching joints, aching muscles, back pain and neck pain.     Skin: Negative for rash.     Allergy: Positive for seasonal allergies. Negative for food allergies.     Endocrine: Negative for cold intolerance and heat intolerance.      Neurological: Positive for headaches.  Negative for dizziness, light-headedness, seizures and tremors.      Hematologic: Negative for bruises/bleeds easily.      Psychiatric: Negative for decreased concentration, depression, nervous/anxious and sleep disturbance.              Objective:      Physical Exam  Vitals and nursing note reviewed.   Constitutional:       General: He is awake.      Appearance: Normal appearance. He is well-developed, well-groomed and overweight.   HENT:      Head: Normocephalic.      Jaw: There is normal jaw occlusion.      Salivary Glands: Right salivary gland is not diffusely enlarged or tender. Left salivary gland is not diffusely enlarged or tender.      Right Ear: Ear canal and external ear normal. Decreased hearing noted. Tympanic membrane is retracted.      Left Ear: Ear canal and external ear normal. Decreased hearing noted. Tympanic membrane is retracted.      Nose: Septal deviation (to the left), mucosal edema (cyanotic, boggy inferior turbinates bilaterally) and rhinorrhea (clear mucus bilaterally) present. No nasal deformity. Rhinorrhea is clear.      Right Turbinates: Enlarged and pale.      Left Turbinates: Enlarged and pale.      Mouth/Throat:      Lips: No lesions.      Mouth: Mucous membranes are moist. No oral lesions.      Dentition: No gum lesions.      Tongue: No lesions.      Palate: No mass and lesions.      Pharynx: Oropharynx is clear. Uvula midline.      Tonsils: 3+ on the right. 3+ on the left.   Eyes:      General: Lids are normal.         Right eye: No discharge.         Left eye: No discharge.      Conjunctiva/sclera:      Right eye: Right conjunctiva is injected. No exudate.     Left eye: Left conjunctiva is injected. No exudate.     Pupils: Pupils are equal, round, and reactive to light.   Neck:      Thyroid: No thyroid mass or thyromegaly.      Trachea: Trachea normal. No tracheal deviation.   Cardiovascular:      Rate and Rhythm: Normal rate and regular rhythm.      Pulses: Normal pulses.       Heart sounds: Normal heart sounds.   Pulmonary:      Effort: Pulmonary effort is normal.      Breath sounds: Normal breath sounds. No stridor. No decreased breath sounds, wheezing, rhonchi or rales.   Abdominal:      General: Bowel sounds are normal.      Palpations: Abdomen is soft.      Tenderness: There is no abdominal tenderness.   Musculoskeletal:      Cervical back: Neck supple. No muscular tenderness. Decreased range of motion.   Lymphadenopathy:      Head:      Right side of head: No submental, submandibular, preauricular, posterior auricular or occipital adenopathy.      Left side of head: No submental, submandibular, preauricular, posterior auricular or occipital adenopathy.      Cervical: No cervical adenopathy.   Skin:     General: Skin is warm and dry.      Findings: No petechiae or rash.      Nails: There is no clubbing.   Neurological:      Mental Status: He is alert and oriented to person, place, and time.      Cranial Nerves: No cranial nerve deficit.      Sensory: No sensory deficit.      Gait: Gait normal.   Psychiatric:         Speech: Speech normal.         Behavior: Behavior normal. Behavior is cooperative.         Thought Content: Thought content normal.         Judgment: Judgment normal.          Assessment:       1. Laryngopharyngeal reflux (LPR)    2. Dysphagia, unspecified type    3. Allergic rhinitis, unspecified seasonality, unspecified trigger    4. PND (post-nasal drip)    5. Nasal septal deviation    6. Bilateral hearing loss, unspecified hearing loss type    7. Hearing aid worn            Plan:       I will have the patient continue with his present drug regimen and will recheck him in 3 months, or sooner on an as-needed basis.  At that visit he will need undergo a flexible nasolaryngoscopy.

## 2023-08-01 PROBLEM — Z95.5 S/P DRUG ELUTING CORONARY STENT PLACEMENT: Status: ACTIVE | Noted: 2022-11-27

## 2023-08-04 ENCOUNTER — LAB VISIT (OUTPATIENT)
Dept: LAB | Facility: HOSPITAL | Age: 68
End: 2023-08-04
Attending: INTERNAL MEDICINE
Payer: MEDICARE

## 2023-08-04 DIAGNOSIS — I25.119 CORONARY ARTERY DISEASE INVOLVING NATIVE CORONARY ARTERY WITH ANGINA PECTORIS, UNSPECIFIED WHETHER NATIVE OR TRANSPLANTED HEART: ICD-10-CM

## 2023-08-04 DIAGNOSIS — Z12.5 PROSTATE CANCER SCREENING: ICD-10-CM

## 2023-08-04 DIAGNOSIS — I10 ESSENTIAL HYPERTENSION: ICD-10-CM

## 2023-08-04 DIAGNOSIS — N18.31 STAGE 3A CHRONIC KIDNEY DISEASE: ICD-10-CM

## 2023-08-04 DIAGNOSIS — E78.5 HYPERLIPIDEMIA, UNSPECIFIED HYPERLIPIDEMIA TYPE: ICD-10-CM

## 2023-08-04 LAB
ALBUMIN SERPL BCP-MCNC: 3.9 G/DL (ref 3.5–5.2)
ALP SERPL-CCNC: 67 U/L (ref 55–135)
ALT SERPL W/O P-5'-P-CCNC: 27 U/L (ref 10–44)
ANION GAP SERPL CALC-SCNC: 10 MMOL/L (ref 8–16)
AST SERPL-CCNC: 28 U/L (ref 10–40)
BASOPHILS # BLD AUTO: 0.07 K/UL (ref 0–0.2)
BASOPHILS NFR BLD: 1.3 % (ref 0–1.9)
BILIRUB SERPL-MCNC: 0.8 MG/DL (ref 0.1–1)
BUN SERPL-MCNC: 22 MG/DL (ref 8–23)
CALCIUM SERPL-MCNC: 9.4 MG/DL (ref 8.7–10.5)
CHLORIDE SERPL-SCNC: 108 MMOL/L (ref 95–110)
CHOLEST SERPL-MCNC: 114 MG/DL (ref 120–199)
CHOLEST/HDLC SERPL: 3.7 {RATIO} (ref 2–5)
CO2 SERPL-SCNC: 24 MMOL/L (ref 23–29)
COMPLEXED PSA SERPL-MCNC: 0.45 NG/ML (ref 0–4)
CREAT SERPL-MCNC: 1.3 MG/DL (ref 0.5–1.4)
DIFFERENTIAL METHOD: ABNORMAL
EOSINOPHIL # BLD AUTO: 0.2 K/UL (ref 0–0.5)
EOSINOPHIL NFR BLD: 3.7 % (ref 0–8)
ERYTHROCYTE [DISTWIDTH] IN BLOOD BY AUTOMATED COUNT: 12.1 % (ref 11.5–14.5)
EST. GFR  (NO RACE VARIABLE): 59 ML/MIN/1.73 M^2
GLUCOSE SERPL-MCNC: 106 MG/DL (ref 70–110)
HCT VFR BLD AUTO: 38.2 % (ref 40–54)
HDLC SERPL-MCNC: 31 MG/DL (ref 40–75)
HDLC SERPL: 27.2 % (ref 20–50)
HGB BLD-MCNC: 13.1 G/DL (ref 14–18)
IMM GRANULOCYTES # BLD AUTO: 0.02 K/UL (ref 0–0.04)
IMM GRANULOCYTES NFR BLD AUTO: 0.4 % (ref 0–0.5)
LDLC SERPL CALC-MCNC: 63 MG/DL (ref 63–159)
LYMPHOCYTES # BLD AUTO: 1.9 K/UL (ref 1–4.8)
LYMPHOCYTES NFR BLD: 34.6 % (ref 18–48)
MCH RBC QN AUTO: 34.3 PG (ref 27–31)
MCHC RBC AUTO-ENTMCNC: 34.3 G/DL (ref 32–36)
MCV RBC AUTO: 100 FL (ref 82–98)
MONOCYTES # BLD AUTO: 0.8 K/UL (ref 0.3–1)
MONOCYTES NFR BLD: 14 % (ref 4–15)
NEUTROPHILS # BLD AUTO: 2.5 K/UL (ref 1.8–7.7)
NEUTROPHILS NFR BLD: 46 % (ref 38–73)
NONHDLC SERPL-MCNC: 83 MG/DL
NRBC BLD-RTO: 0 /100 WBC
PLATELET # BLD AUTO: 173 K/UL (ref 150–450)
PMV BLD AUTO: 11.3 FL (ref 9.2–12.9)
POTASSIUM SERPL-SCNC: 4.4 MMOL/L (ref 3.5–5.1)
PROT SERPL-MCNC: 7.2 G/DL (ref 6–8.4)
RBC # BLD AUTO: 3.82 M/UL (ref 4.6–6.2)
SODIUM SERPL-SCNC: 142 MMOL/L (ref 136–145)
TRIGL SERPL-MCNC: 100 MG/DL (ref 30–150)
WBC # BLD AUTO: 5.35 K/UL (ref 3.9–12.7)

## 2023-08-04 PROCEDURE — 84153 ASSAY OF PSA TOTAL: CPT | Performed by: INTERNAL MEDICINE

## 2023-08-04 PROCEDURE — 80061 LIPID PANEL: CPT | Performed by: INTERNAL MEDICINE

## 2023-08-04 PROCEDURE — 80053 COMPREHEN METABOLIC PANEL: CPT | Performed by: INTERNAL MEDICINE

## 2023-08-04 PROCEDURE — 85025 COMPLETE CBC W/AUTO DIFF WBC: CPT | Performed by: INTERNAL MEDICINE

## 2023-08-04 PROCEDURE — 36415 COLL VENOUS BLD VENIPUNCTURE: CPT | Performed by: INTERNAL MEDICINE

## 2023-08-25 ENCOUNTER — OFFICE VISIT (OUTPATIENT)
Dept: OTOLARYNGOLOGY | Facility: CLINIC | Age: 68
End: 2023-08-25
Payer: MEDICARE

## 2023-08-25 VITALS
HEIGHT: 69 IN | OXYGEN SATURATION: 95 % | DIASTOLIC BLOOD PRESSURE: 57 MMHG | WEIGHT: 246.38 LBS | SYSTOLIC BLOOD PRESSURE: 113 MMHG | HEART RATE: 62 BPM | BODY MASS INDEX: 36.49 KG/M2

## 2023-08-25 DIAGNOSIS — R09.82 PND (POST-NASAL DRIP): ICD-10-CM

## 2023-08-25 DIAGNOSIS — H90.3 SENSORINEURAL HEARING LOSS (SNHL) OF BOTH EARS: ICD-10-CM

## 2023-08-25 DIAGNOSIS — J34.2 NASAL SEPTAL DEVIATION: ICD-10-CM

## 2023-08-25 DIAGNOSIS — K21.9 LARYNGOPHARYNGEAL REFLUX (LPR): Primary | ICD-10-CM

## 2023-08-25 DIAGNOSIS — J30.9 ALLERGIC RHINITIS, UNSPECIFIED SEASONALITY, UNSPECIFIED TRIGGER: ICD-10-CM

## 2023-08-25 DIAGNOSIS — R13.10 DYSPHAGIA, UNSPECIFIED TYPE: ICD-10-CM

## 2023-08-25 PROCEDURE — 3078F PR MOST RECENT DIASTOLIC BLOOD PRESSURE < 80 MM HG: ICD-10-PCS | Mod: CPTII,S$GLB,, | Performed by: SPECIALIST

## 2023-08-25 PROCEDURE — 3288F PR FALLS RISK ASSESSMENT DOCUMENTED: ICD-10-PCS | Mod: CPTII,S$GLB,, | Performed by: SPECIALIST

## 2023-08-25 PROCEDURE — 1159F MED LIST DOCD IN RCRD: CPT | Mod: CPTII,S$GLB,, | Performed by: SPECIALIST

## 2023-08-25 PROCEDURE — 4010F PR ACE/ARB THEARPY RXD/TAKEN: ICD-10-PCS | Mod: CPTII,S$GLB,, | Performed by: SPECIALIST

## 2023-08-25 PROCEDURE — 99214 OFFICE O/P EST MOD 30 MIN: CPT | Mod: 25,S$GLB,, | Performed by: SPECIALIST

## 2023-08-25 PROCEDURE — 4010F ACE/ARB THERAPY RXD/TAKEN: CPT | Mod: CPTII,S$GLB,, | Performed by: SPECIALIST

## 2023-08-25 PROCEDURE — 1126F AMNT PAIN NOTED NONE PRSNT: CPT | Mod: CPTII,S$GLB,, | Performed by: SPECIALIST

## 2023-08-25 PROCEDURE — 3288F FALL RISK ASSESSMENT DOCD: CPT | Mod: CPTII,S$GLB,, | Performed by: SPECIALIST

## 2023-08-25 PROCEDURE — 3008F BODY MASS INDEX DOCD: CPT | Mod: CPTII,S$GLB,, | Performed by: SPECIALIST

## 2023-08-25 PROCEDURE — 3078F DIAST BP <80 MM HG: CPT | Mod: CPTII,S$GLB,, | Performed by: SPECIALIST

## 2023-08-25 PROCEDURE — 31575 LARYNGOSCOPY: ICD-10-PCS | Mod: S$GLB,,, | Performed by: SPECIALIST

## 2023-08-25 PROCEDURE — 3074F SYST BP LT 130 MM HG: CPT | Mod: CPTII,S$GLB,, | Performed by: SPECIALIST

## 2023-08-25 PROCEDURE — 99214 PR OFFICE/OUTPT VISIT, EST, LEVL IV, 30-39 MIN: ICD-10-PCS | Mod: 25,S$GLB,, | Performed by: SPECIALIST

## 2023-08-25 PROCEDURE — 3074F PR MOST RECENT SYSTOLIC BLOOD PRESSURE < 130 MM HG: ICD-10-PCS | Mod: CPTII,S$GLB,, | Performed by: SPECIALIST

## 2023-08-25 PROCEDURE — 1101F PT FALLS ASSESS-DOCD LE1/YR: CPT | Mod: CPTII,S$GLB,, | Performed by: SPECIALIST

## 2023-08-25 PROCEDURE — 99999 PR PBB SHADOW E&M-EST. PATIENT-LVL IV: ICD-10-PCS | Mod: PBBFAC,,, | Performed by: SPECIALIST

## 2023-08-25 PROCEDURE — 1160F RVW MEDS BY RX/DR IN RCRD: CPT | Mod: CPTII,S$GLB,, | Performed by: SPECIALIST

## 2023-08-25 PROCEDURE — 1160F PR REVIEW ALL MEDS BY PRESCRIBER/CLIN PHARMACIST DOCUMENTED: ICD-10-PCS | Mod: CPTII,S$GLB,, | Performed by: SPECIALIST

## 2023-08-25 PROCEDURE — 1126F PR PAIN SEVERITY QUANTIFIED, NO PAIN PRESENT: ICD-10-PCS | Mod: CPTII,S$GLB,, | Performed by: SPECIALIST

## 2023-08-25 PROCEDURE — 1101F PR PT FALLS ASSESS DOC 0-1 FALLS W/OUT INJ PAST YR: ICD-10-PCS | Mod: CPTII,S$GLB,, | Performed by: SPECIALIST

## 2023-08-25 PROCEDURE — 1159F PR MEDICATION LIST DOCUMENTED IN MEDICAL RECORD: ICD-10-PCS | Mod: CPTII,S$GLB,, | Performed by: SPECIALIST

## 2023-08-25 PROCEDURE — 31575 DIAGNOSTIC LARYNGOSCOPY: CPT | Mod: S$GLB,,, | Performed by: SPECIALIST

## 2023-08-25 PROCEDURE — 99999 PR PBB SHADOW E&M-EST. PATIENT-LVL IV: CPT | Mod: PBBFAC,,, | Performed by: SPECIALIST

## 2023-08-25 PROCEDURE — 3008F PR BODY MASS INDEX (BMI) DOCUMENTED: ICD-10-PCS | Mod: CPTII,S$GLB,, | Performed by: SPECIALIST

## 2023-08-25 RX ORDER — LORATADINE 10 MG/1
10 TABLET ORAL DAILY
Qty: 90 TABLET | Refills: 3 | Status: SHIPPED | OUTPATIENT
Start: 2023-08-25 | End: 2024-08-24

## 2023-08-25 NOTE — PROCEDURES
"Laryngoscopy    Date/Time: 8/25/2023 2:40 PM    Performed by: BERNARDO Esquivel MD  Authorized by: BERNARDO Esquivel MD    Time out: Immediately prior to procedure a "time out" was called to verify the correct patient, procedure, equipment, support staff and site/side marked as required.    Consent Done?:  Yes (Verbal)  Anesthesia:     Local anesthetic:  4% Xylocaine spray with Benji-Synephrine    Patient tolerance:  Patient tolerated the procedure well with no immediate complications    Decongestion performed?: Yes    Laryngoscopy:     Areas examined:  Larynx, hypopharynx, oropharynx, nasopharynx, nasal cavities and vocal cords    Laryngoscope size:  4 mm (Flexible video nasolaryngoscopy)  Nose External:      No external nasal deformity  Nose Intranasal:      Mucosa no polyps     Mucosa ulcers not present     No mucosa lesions present (clear mucus in the nasal passages bilaterally)     Septum gross deformity (septum deviated high to the right and on the floor the nose to the left)     Enlarged turbinates (inferior turbinates enlarged bilaterally)  Nasopharynx:      No mucosa lesions     Adenoids not present     Posterior choanae patent     Eustachian tube patent  Larynx/hypopharynx:      No epiglottis lesions     No epiglottis edema     No AE folds lesions     No vocal cord polyps     Equal and normal bilateral (vocal cords move symmetrically, no mucosal lesions noted)     No hypopharynx lesions     No piriform sinus pooling     No piriform sinus lesions     Post cricoid edema (trace, improved from last endoscopy 6 months ago)     Post cricoid erythema (trace, improved from last endoscopy done 6 months ago)     Flexible video nasolaryngoscopy-nasal septal deviation and nasal changes of allergic rhinitis; laryngeal changes consistent with laryngopharyngeal reflux that is improving on H2 agonist therapy.    "

## 2023-08-25 NOTE — PROGRESS NOTES
Subjective:       Patient ID: Pratik Laird is a 68 y.o. male.    Chief Complaint: Follow-up (3 mth Scope)    The patient is returning for follow-up visit.  There are multiple issues to discuss:  1. Laryngopharyngeal reflux:  The patient feels that his symptoms of reflux have improved significantly.  He has infrequent throat clearing, phlegm in the throat or globus sensation he is not having any choking spell .  He is taking famotidine twice daily.  He is returning for a flexible video nasolaryngoscopy.  2. Allergic rhinitis:  He is having some nasal congestion and clear nasal discharge.  He is using loratadine on a daily basis and supplements with ipratropium bromide nasal spray when needed.    3. Sensorineural hearing loss/wears hearing aids:  He wears hearing aids in both ears.          Review of Systems     Constitutional: Positive for fatigue.  Negative for appetite change, chills, fever and unexpected weight loss.      HENT: Positive for hearing loss (wears hearing aids bilaterally), postnasal drip, runny nose, sinus pressure, sore throat, stuffy nose and trouble swallowing.  Negative for ear discharge, ear infection, ear pain, facial swelling, mouth sores, nosebleeds, ringing in the ears, sinus infection, tonsil infection, dental problems and voice change.      Eyes:  Negative for change in eyesight, eye drainage, eye itching and photophobia.     Respiratory:  Positive for cough. Negative for shortness of breath, sleep apnea, snoring and wheezing.      Cardiovascular:  Negative for chest pain, foot swelling, irregular heartbeat and swollen veins.     Gastrointestinal:  Negative for abdominal pain, acid reflux, constipation, diarrhea, heartburn and vomiting.     Genitourinary: Negative for difficulty urinating, sexual problems and frequent urination.     Musc: Negative for aching joints, aching muscles, back pain and neck pain.     Skin: Negative for rash.     Allergy: Positive for seasonal allergies.  Negative for food allergies.     Endocrine: Negative for cold intolerance and heat intolerance.      Neurological: Positive for headaches. Negative for dizziness, light-headedness, seizures and tremors.      Hematologic: Negative for bruises/bleeds easily.      Psychiatric: Negative for decreased concentration, depression, nervous/anxious and sleep disturbance.                Objective:      Physical Exam  Vitals and nursing note reviewed.   Constitutional:       General: He is awake.      Appearance: Normal appearance. He is well-developed, well-groomed and overweight.   HENT:      Head: Normocephalic.      Jaw: There is normal jaw occlusion.      Salivary Glands: Right salivary gland is not diffusely enlarged or tender. Left salivary gland is not diffusely enlarged or tender.      Right Ear: Ear canal and external ear normal. Decreased hearing noted. Tympanic membrane is retracted.      Left Ear: Ear canal and external ear normal. Decreased hearing noted. Tympanic membrane is retracted.      Nose: Septal deviation (to the left), mucosal edema (cyanotic, boggy inferior turbinates bilaterally) and rhinorrhea (clear mucus bilaterally) present. No nasal deformity. Rhinorrhea is clear.      Right Turbinates: Enlarged and pale.      Left Turbinates: Enlarged and pale.      Mouth/Throat:      Lips: No lesions.      Mouth: Mucous membranes are moist. No oral lesions.      Dentition: No gum lesions.      Tongue: No lesions.      Palate: No mass and lesions.      Pharynx: Oropharynx is clear. Uvula midline.      Tonsils: 3+ on the right. 3+ on the left.   Eyes:      General: Lids are normal.         Right eye: No discharge.         Left eye: No discharge.      Conjunctiva/sclera:      Right eye: Right conjunctiva is injected. No exudate.     Left eye: Left conjunctiva is injected. No exudate.     Pupils: Pupils are equal, round, and reactive to light.   Neck:      Thyroid: No thyroid mass or thyromegaly.      Trachea:  Trachea normal. No tracheal deviation.   Cardiovascular:      Rate and Rhythm: Normal rate and regular rhythm.      Pulses: Normal pulses.      Heart sounds: Normal heart sounds.   Pulmonary:      Effort: Pulmonary effort is normal.      Breath sounds: Normal breath sounds. No stridor. No decreased breath sounds, wheezing, rhonchi or rales.   Abdominal:      General: Bowel sounds are normal.      Palpations: Abdomen is soft.      Tenderness: There is no abdominal tenderness.   Musculoskeletal:      Cervical back: Neck supple. No muscular tenderness. Decreased range of motion.   Lymphadenopathy:      Head:      Right side of head: No submental, submandibular, preauricular, posterior auricular or occipital adenopathy.      Left side of head: No submental, submandibular, preauricular, posterior auricular or occipital adenopathy.      Cervical: No cervical adenopathy.   Skin:     General: Skin is warm and dry.      Findings: No petechiae or rash.      Nails: There is no clubbing.   Neurological:      Mental Status: He is alert and oriented to person, place, and time.      Cranial Nerves: No cranial nerve deficit.      Sensory: No sensory deficit.      Gait: Gait normal.   Psychiatric:         Speech: Speech normal.         Behavior: Behavior normal. Behavior is cooperative.         Thought Content: Thought content normal.         Judgment: Judgment normal.          Flexible video nasolaryngoscopy-nasal septal deviation and nasal changes of allergic rhinitis; laryngeal changes consistent with laryngopharyngeal reflux that is improving on H2 agonist therapy    Assessment:       1. Laryngopharyngeal reflux (LPR)    2. Dysphagia, unspecified type    3. Allergic rhinitis, unspecified seasonality, unspecified trigger    4. PND (post-nasal drip)    5. Nasal septal deviation    6. Sensorineural hearing loss (SNHL) of both ears            Plan:       I will have the patient continue with his present drug regimen and will recheck  him in 3 months, or sooner on an as-needed basis.

## 2023-09-26 RX ORDER — IPRATROPIUM BROMIDE 21 UG/1
SPRAY, METERED NASAL
Qty: 90 ML | Refills: 2 | Status: SHIPPED | OUTPATIENT
Start: 2023-09-26

## 2023-10-02 ENCOUNTER — CLINICAL SUPPORT (OUTPATIENT)
Dept: LAB | Facility: HOSPITAL | Age: 68
End: 2023-10-02
Attending: INTERNAL MEDICINE
Payer: MEDICARE

## 2023-10-02 ENCOUNTER — HOSPITAL ENCOUNTER (OUTPATIENT)
Dept: RADIOLOGY | Facility: HOSPITAL | Age: 68
Discharge: HOME | End: 2023-10-02
Attending: INTERNAL MEDICINE
Payer: MEDICARE

## 2023-10-02 DIAGNOSIS — I10 ESSENTIAL HYPERTENSION: ICD-10-CM

## 2023-10-02 PROCEDURE — 93010 ELECTROCARDIOGRAM REPORT: CPT | Mod: ,,, | Performed by: INTERNAL MEDICINE

## 2023-10-02 PROCEDURE — 93010 EKG 12-LEAD: ICD-10-PCS | Mod: ,,, | Performed by: INTERNAL MEDICINE

## 2023-10-02 PROCEDURE — 93005 ELECTROCARDIOGRAM TRACING: CPT

## 2023-10-02 PROCEDURE — 71046 X-RAY EXAM CHEST 2 VIEWS: CPT | Mod: TC,FY

## 2023-10-02 PROCEDURE — 71046 X-RAY EXAM CHEST 2 VIEWS: CPT | Mod: 26,,, | Performed by: STUDENT IN AN ORGANIZED HEALTH CARE EDUCATION/TRAINING PROGRAM

## 2023-10-02 PROCEDURE — 71046 XR CHEST PA AND LATERAL: ICD-10-PCS | Mod: 26,,, | Performed by: STUDENT IN AN ORGANIZED HEALTH CARE EDUCATION/TRAINING PROGRAM

## 2024-01-26 ENCOUNTER — OFFICE VISIT (OUTPATIENT)
Dept: OTOLARYNGOLOGY | Facility: CLINIC | Age: 69
End: 2024-01-26
Payer: MEDICARE

## 2024-01-26 VITALS
TEMPERATURE: 98 F | RESPIRATION RATE: 18 BRPM | SYSTOLIC BLOOD PRESSURE: 121 MMHG | OXYGEN SATURATION: 98 % | DIASTOLIC BLOOD PRESSURE: 65 MMHG | HEART RATE: 70 BPM

## 2024-01-26 DIAGNOSIS — R09.82 PND (POST-NASAL DRIP): ICD-10-CM

## 2024-01-26 DIAGNOSIS — H90.3 SENSORINEURAL HEARING LOSS (SNHL) OF BOTH EARS: ICD-10-CM

## 2024-01-26 DIAGNOSIS — K21.9 LARYNGOPHARYNGEAL REFLUX (LPR): Primary | ICD-10-CM

## 2024-01-26 DIAGNOSIS — J30.9 ALLERGIC RHINITIS, UNSPECIFIED SEASONALITY, UNSPECIFIED TRIGGER: ICD-10-CM

## 2024-01-26 DIAGNOSIS — R09.89 PHLEGM IN THROAT: ICD-10-CM

## 2024-01-26 DIAGNOSIS — Z97.4 HEARING AID WORN: ICD-10-CM

## 2024-01-26 DIAGNOSIS — R13.10 DYSPHAGIA, UNSPECIFIED TYPE: ICD-10-CM

## 2024-01-26 PROCEDURE — 99213 OFFICE O/P EST LOW 20 MIN: CPT | Mod: S$GLB,,, | Performed by: SPECIALIST

## 2024-01-26 PROCEDURE — 99999 PR PBB SHADOW E&M-EST. PATIENT-LVL III: CPT | Mod: PBBFAC,,, | Performed by: SPECIALIST

## 2024-01-26 RX ORDER — FAMOTIDINE 20 MG/1
20 TABLET, FILM COATED ORAL 2 TIMES DAILY
Qty: 180 TABLET | Refills: 3 | Status: SHIPPED | OUTPATIENT
Start: 2024-01-26

## 2024-01-26 NOTE — PROGRESS NOTES
Subjective:       Patient ID: Pratik Laird is a 68 y.o. male.    Chief Complaint: Follow-up    The patient is returning for follow-up visit.  There are multiple issues to discuss:  1. Laryngopharyngeal reflux:   The patient has noticed significant improvement in throat clearing, globus sensation and dysphagia.  He is trying to follow an anti-reflux diet.  He is taking famotidine twice daily.     2. Allergic rhinitis:   Nasal secretions are clear.  He has not having issues regarding obstructed in his breathing.  He is using loratadine on a daily basis and supplements with ipratropium bromide nasal spray when needed.    3. Sensorineural hearing loss/wears hearing aids:  He wears hearing aids in both ears.          Review of Systems     Constitutional: Positive for fatigue.  Negative for appetite change, chills, fever and unexpected weight loss.      HENT: Positive for hearing loss (wears hearing aids bilaterally), postnasal drip, runny nose, sinus pressure, sore throat, stuffy nose and trouble swallowing.  Negative for ear discharge, ear infection, ear pain, facial swelling, mouth sores, nosebleeds, ringing in the ears, sinus infection, tonsil infection, dental problems and voice change.      Eyes:  Negative for change in eyesight, eye drainage, eye itching and photophobia.     Respiratory:  Positive for cough. Negative for shortness of breath, sleep apnea, snoring and wheezing.      Cardiovascular:  Negative for chest pain, foot swelling, irregular heartbeat and swollen veins.     Gastrointestinal:  Negative for abdominal pain, acid reflux, constipation, diarrhea, heartburn and vomiting.     Genitourinary: Negative for difficulty urinating, sexual problems and frequent urination.     Musc: Negative for aching joints, aching muscles, back pain and neck pain.     Skin: Negative for rash.     Allergy: Positive for seasonal allergies. Negative for food allergies.     Endocrine: Negative for cold intolerance and  heat intolerance.      Neurological: Positive for headaches. Negative for dizziness, light-headedness, seizures and tremors.      Hematologic: Negative for bruises/bleeds easily.      Psychiatric: Negative for decreased concentration, depression, nervous/anxious and sleep disturbance.                Objective:      Physical Exam  Vitals and nursing note reviewed.   Constitutional:       General: He is awake.      Appearance: Normal appearance. He is well-developed, well-groomed and overweight.   HENT:      Head: Normocephalic.      Jaw: There is normal jaw occlusion.      Salivary Glands: Right salivary gland is not diffusely enlarged or tender. Left salivary gland is not diffusely enlarged or tender.      Right Ear: Ear canal and external ear normal. Decreased hearing noted. Tympanic membrane is retracted.      Left Ear: Ear canal and external ear normal. Decreased hearing noted. Tympanic membrane is retracted.      Nose: Septal deviation (to the left), mucosal edema (cyanotic, boggy inferior turbinates bilaterally) and rhinorrhea (clear mucus bilaterally) present. No nasal deformity. Rhinorrhea is clear.      Right Turbinates: Enlarged and pale.      Left Turbinates: Enlarged and pale.      Mouth/Throat:      Lips: No lesions.      Mouth: Mucous membranes are moist. No oral lesions.      Dentition: No gum lesions.      Tongue: No lesions.      Palate: No mass and lesions.      Pharynx: Oropharynx is clear. Uvula midline.      Tonsils: 3+ on the right. 3+ on the left.   Eyes:      General: Lids are normal.         Right eye: No discharge.         Left eye: No discharge.      Conjunctiva/sclera:      Right eye: Right conjunctiva is injected. No exudate.     Left eye: Left conjunctiva is injected. No exudate.     Pupils: Pupils are equal, round, and reactive to light.   Neck:      Thyroid: No thyroid mass or thyromegaly.      Trachea: Trachea normal. No tracheal deviation.   Cardiovascular:      Rate and Rhythm:  Normal rate and regular rhythm.      Pulses: Normal pulses.      Heart sounds: Normal heart sounds.   Pulmonary:      Effort: Pulmonary effort is normal.      Breath sounds: Normal breath sounds. No stridor. No decreased breath sounds, wheezing, rhonchi or rales.   Abdominal:      General: Bowel sounds are normal.      Palpations: Abdomen is soft.      Tenderness: There is no abdominal tenderness.   Musculoskeletal:      Cervical back: Neck supple. No muscular tenderness. Decreased range of motion.   Lymphadenopathy:      Head:      Right side of head: No submental, submandibular, preauricular, posterior auricular or occipital adenopathy.      Left side of head: No submental, submandibular, preauricular, posterior auricular or occipital adenopathy.      Cervical: No cervical adenopathy.   Skin:     General: Skin is warm and dry.      Findings: No petechiae or rash.      Nails: There is no clubbing.   Neurological:      Mental Status: He is alert and oriented to person, place, and time.      Cranial Nerves: No cranial nerve deficit.      Sensory: No sensory deficit.      Gait: Gait normal.   Psychiatric:         Speech: Speech normal.         Behavior: Behavior normal. Behavior is cooperative.         Thought Content: Thought content normal.         Judgment: Judgment normal.            Assessment:       1. Laryngopharyngeal reflux (LPR)    2. Dysphagia, unspecified type    3. Allergic rhinitis, unspecified seasonality, unspecified trigger    4. PND (post-nasal drip)    5. Sensorineural hearing loss (SNHL) of both ears    6. Hearing aid worn    7. Phlegm in throat            Plan:       I  will have the patient continue with his present drug regimen.  I will recheck him in 3 months.  At that visit he will need undergo a flexible nasolaryngoscopy.

## 2024-05-17 ENCOUNTER — OFFICE VISIT (OUTPATIENT)
Dept: OTOLARYNGOLOGY | Facility: CLINIC | Age: 69
End: 2024-05-17
Payer: MEDICARE

## 2024-05-17 VITALS
WEIGHT: 235.13 LBS | HEIGHT: 69 IN | BODY MASS INDEX: 34.82 KG/M2 | SYSTOLIC BLOOD PRESSURE: 104 MMHG | OXYGEN SATURATION: 97 % | DIASTOLIC BLOOD PRESSURE: 58 MMHG | HEART RATE: 65 BPM

## 2024-05-17 DIAGNOSIS — J30.9 ALLERGIC RHINITIS, UNSPECIFIED SEASONALITY, UNSPECIFIED TRIGGER: ICD-10-CM

## 2024-05-17 DIAGNOSIS — J34.2 NASAL SEPTAL DEVIATION: ICD-10-CM

## 2024-05-17 DIAGNOSIS — K21.9 LARYNGOPHARYNGEAL REFLUX (LPR): Primary | ICD-10-CM

## 2024-05-17 DIAGNOSIS — R09.82 PND (POST-NASAL DRIP): ICD-10-CM

## 2024-05-17 DIAGNOSIS — R13.10 DYSPHAGIA, UNSPECIFIED TYPE: ICD-10-CM

## 2024-05-17 DIAGNOSIS — Z97.4 HEARING AID WORN: ICD-10-CM

## 2024-05-17 DIAGNOSIS — H91.93 BILATERAL HEARING LOSS, UNSPECIFIED HEARING LOSS TYPE: ICD-10-CM

## 2024-05-17 PROCEDURE — 31575 DIAGNOSTIC LARYNGOSCOPY: CPT | Mod: S$GLB,,, | Performed by: SPECIALIST

## 2024-05-17 PROCEDURE — 4010F ACE/ARB THERAPY RXD/TAKEN: CPT | Mod: CPTII,S$GLB,, | Performed by: SPECIALIST

## 2024-05-17 PROCEDURE — 99999 PR PBB SHADOW E&M-EST. PATIENT-LVL IV: CPT | Mod: PBBFAC,,, | Performed by: SPECIALIST

## 2024-05-17 PROCEDURE — 1160F RVW MEDS BY RX/DR IN RCRD: CPT | Mod: CPTII,S$GLB,, | Performed by: SPECIALIST

## 2024-05-17 PROCEDURE — 1159F MED LIST DOCD IN RCRD: CPT | Mod: CPTII,S$GLB,, | Performed by: SPECIALIST

## 2024-05-17 PROCEDURE — 1126F AMNT PAIN NOTED NONE PRSNT: CPT | Mod: CPTII,S$GLB,, | Performed by: SPECIALIST

## 2024-05-17 PROCEDURE — 3288F FALL RISK ASSESSMENT DOCD: CPT | Mod: CPTII,S$GLB,, | Performed by: SPECIALIST

## 2024-05-17 PROCEDURE — 99214 OFFICE O/P EST MOD 30 MIN: CPT | Mod: 25,S$GLB,, | Performed by: SPECIALIST

## 2024-05-17 PROCEDURE — 3008F BODY MASS INDEX DOCD: CPT | Mod: CPTII,S$GLB,, | Performed by: SPECIALIST

## 2024-05-17 PROCEDURE — 3074F SYST BP LT 130 MM HG: CPT | Mod: CPTII,S$GLB,, | Performed by: SPECIALIST

## 2024-05-17 PROCEDURE — 1101F PT FALLS ASSESS-DOCD LE1/YR: CPT | Mod: CPTII,S$GLB,, | Performed by: SPECIALIST

## 2024-05-17 PROCEDURE — 3078F DIAST BP <80 MM HG: CPT | Mod: CPTII,S$GLB,, | Performed by: SPECIALIST

## 2024-05-17 NOTE — PROCEDURES
"Laryngoscopy    Date/Time: 5/17/2024 3:00 PM    Performed by: BERNARDO Esquivel MD  Authorized by: BERNARDO Esquivel MD    Time out: Immediately prior to procedure a "time out" was called to verify the correct patient, procedure, equipment, support staff and site/side marked as required.    Consent Done?:  Yes (Verbal)  Anesthesia:     Local anesthetic:  4% Xylocaine spray with Benji-Synephrine    Patient tolerance:  Patient tolerated the procedure well with no immediate complications    Decongestion performed?: Yes    Laryngoscopy:     Areas examined:  Larynx, hypopharynx, oropharynx, nasopharynx, nasal cavities and vocal cords    Laryngoscope size:  4 mm (Flexible video nasolaryngoscopy)  Nose External:      No external nasal deformity  Nose Intranasal:      Mucosa no polyps     Mucosa ulcers not present     No mucosa lesions present (clear mucus in the nasal passages bilaterally)     Septum gross deformity (septum deviated high to the right on the floor the nose to the left)     Enlarged turbinates (inferior turbinates enlarged bilaterally)  Nasopharynx:      No mucosa lesions     Adenoids not present     Posterior choanae patent     Eustachian tube patent  Larynx/hypopharynx:      No epiglottis lesions     No epiglottis edema     No AE folds lesions     No vocal cord polyps     Equal and normal bilateral (vocal cords move symmetrically, no mucosal lesions)     No hypopharynx lesions     No piriform sinus pooling     No piriform sinus lesions     Post cricoid edema (mild, no significant change from last endoscopy)     Post cricoid erythema (Mild, no significant change from last endoscopy)     Flexible video nasolaryngoscopy-nasal septal deviation and nasal changes allergic rhinitis; laryngeal changes consistent with laryngal pharyngeal reflux that is stable on H2 agonist therapy    "

## 2024-05-17 NOTE — PROGRESS NOTES
Subjective:       Patient ID: Pratik Laird is a 69 y.o. male.    Chief Complaint: No chief complaint on file.    The patient is returning for follow-up visit.  There are multiple issues to discuss:  1. Laryngopharyngeal reflux:  The patient is doing well regarding his LPR symptoms.  Has rare dysphagia and globus sensation.  As long as he follows his diet he does well.  He is taking famotidine twice daily.     2. Allergic rhinitis:  Nasal secretions are clear.  He is having some nasal stuffiness.  When his nose is runny he uses the ipratropium bromide spray.  He is using loratadine on a daily basis and supplements with ipratropium bromide nasal spray when needed.  Level of symptom control is good.  3. Sensorineural hearing loss/wears hearing aids:  He wears hearing aids in both ears.          Review of Systems     Constitutional: Positive for fatigue.  Negative for appetite change, chills, fever and unexpected weight loss.      HENT: Positive for hearing loss (wears hearing aids bilaterally), postnasal drip, runny nose, sinus pressure, sore throat, stuffy nose and trouble swallowing.  Negative for ear discharge, ear infection, ear pain, facial swelling, mouth sores, nosebleeds, ringing in the ears, sinus infection, tonsil infection, dental problems and voice change.      Eyes:  Negative for change in eyesight, eye drainage, eye itching and photophobia.     Respiratory:  Positive for cough. Negative for shortness of breath, sleep apnea, snoring and wheezing.      Cardiovascular:  Negative for chest pain, foot swelling, irregular heartbeat and swollen veins.     Gastrointestinal:  Negative for abdominal pain, acid reflux, constipation, diarrhea, heartburn and vomiting.     Genitourinary: Negative for difficulty urinating, sexual problems and frequent urination.     Musc: Negative for aching joints, aching muscles, back pain and neck pain.     Skin: Negative for rash.     Allergy: Positive for seasonal allergies.  Negative for food allergies.     Endocrine: Negative for cold intolerance and heat intolerance.      Neurological: Positive for headaches. Negative for dizziness, light-headedness, seizures and tremors.      Hematologic: Negative for bruises/bleeds easily.      Psychiatric: Negative for decreased concentration, depression, nervous/anxious and sleep disturbance.                Objective:      Physical Exam  Vitals and nursing note reviewed.   Constitutional:       General: He is awake.      Appearance: Normal appearance. He is well-developed, well-groomed and overweight.   HENT:      Head: Normocephalic.      Jaw: There is normal jaw occlusion.      Salivary Glands: Right salivary gland is not diffusely enlarged or tender. Left salivary gland is not diffusely enlarged or tender.      Right Ear: Ear canal and external ear normal. Decreased hearing noted. Tympanic membrane is retracted.      Left Ear: Ear canal and external ear normal. Decreased hearing noted. Tympanic membrane is retracted.      Nose: Septal deviation (to the left), mucosal edema (cyanotic, boggy inferior turbinates bilaterally) and rhinorrhea (clear mucus bilaterally) present. No nasal deformity. Rhinorrhea is clear.      Right Turbinates: Enlarged and pale.      Left Turbinates: Enlarged and pale.      Mouth/Throat:      Lips: No lesions.      Mouth: Mucous membranes are moist. No oral lesions.      Dentition: No gum lesions.      Tongue: No lesions.      Palate: No mass and lesions.      Pharynx: Oropharynx is clear. Uvula midline.      Tonsils: 3+ on the right. 3+ on the left.   Eyes:      General: Lids are normal.         Right eye: No discharge.         Left eye: No discharge.      Conjunctiva/sclera:      Right eye: Right conjunctiva is injected. No exudate.     Left eye: Left conjunctiva is injected. No exudate.     Pupils: Pupils are equal, round, and reactive to light.   Neck:      Thyroid: No thyroid mass or thyromegaly.      Trachea:  Trachea normal. No tracheal deviation.   Cardiovascular:      Rate and Rhythm: Normal rate and regular rhythm.      Pulses: Normal pulses.      Heart sounds: Normal heart sounds.   Pulmonary:      Effort: Pulmonary effort is normal.      Breath sounds: Normal breath sounds. No stridor. No decreased breath sounds, wheezing, rhonchi or rales.   Abdominal:      General: Bowel sounds are normal.      Palpations: Abdomen is soft.      Tenderness: There is no abdominal tenderness.   Musculoskeletal:      Cervical back: Neck supple. No muscular tenderness. Decreased range of motion.   Lymphadenopathy:      Head:      Right side of head: No submental, submandibular, preauricular, posterior auricular or occipital adenopathy.      Left side of head: No submental, submandibular, preauricular, posterior auricular or occipital adenopathy.      Cervical: No cervical adenopathy.   Skin:     General: Skin is warm and dry.      Findings: No petechiae or rash.      Nails: There is no clubbing.   Neurological:      Mental Status: He is alert and oriented to person, place, and time.      Cranial Nerves: No cranial nerve deficit.      Sensory: No sensory deficit.      Gait: Gait normal.   Psychiatric:         Speech: Speech normal.         Behavior: Behavior normal. Behavior is cooperative.         Thought Content: Thought content normal.         Judgment: Judgment normal.         Flexible Nasolaryngoscopy-nasal septal deviation and nasal changes allergic rhinitis; laryngeal changes consistent with laryngal pharyngeal reflux that is stable on famotidine therapy    Laryngeal pictures:                  Nasopharynx/torus tubarius pictures              Right nasal passage pictures          Left nasal passage pictures             Assessment:       1. Laryngopharyngeal reflux (LPR)    2. Dysphagia, unspecified type    3. Allergic rhinitis, unspecified seasonality, unspecified trigger    4. PND (post-nasal drip)    5. Nasal septal deviation     6. Bilateral hearing loss, unspecified hearing loss type    7. Hearing aid worn              Plan:       I  will have the patient continue with his present drug regimen.  I will have him try taking the loratadine at night rather than in the morning to see if that helps with morning congestion.  I will recheck him in 3 months.              DISCLAIMER: This note was prepared with [employer] voice recognition transcription software. Garbled syntax, mangled pronouns, and other bizarre constructions may be attributed to that software system. While efforts were made to correct any mistakes made by this voice recognition program, some errors and/or omissions may remain in the note that were missed when the note was originally created.

## 2024-08-18 NOTE — PROGRESS NOTES
Subjective:       Patient ID: Pratik Laird is a 69 y.o. male.    Chief Complaint: Follow-up    The patient is returning for a 3 month follow-up visit.  There are multiple issues to discuss:  1. Laryngopharyngeal reflux::  He does have throat clearing and phlegm in his throat in the mornings when he 1st wakes up.  Globus sensation and dysphagia have resolve.  He does not choke on food.  He continues to improve relative to his LPR symptoms.  He is taking famotidine twice daily.     2. Allergic rhinitis:  Nasal secretions are clear.  When the patient sleeps at night he turns from side-to-side because of blockage in his nose.  3. Sensorineural hearing loss/wears hearing aids:  He wears his hearing aids pretty much all day everyday in both ears.          Review of Systems     Constitutional: Positive for fatigue.  Negative for appetite change, chills, fever and unexpected weight loss.      HENT: Positive for hearing loss (wears hearing aids bilaterally), postnasal drip, runny nose, sinus pressure, sore throat, stuffy nose and trouble swallowing.  Negative for ear discharge, ear infection, ear pain, facial swelling, mouth sores, nosebleeds, ringing in the ears, sinus infection, tonsil infection, dental problems and voice change.      Eyes:  Negative for change in eyesight, eye drainage, eye itching and photophobia.     Respiratory:  Positive for cough. Negative for shortness of breath, sleep apnea, snoring and wheezing.      Cardiovascular:  Negative for chest pain, foot swelling, irregular heartbeat and swollen veins.     Gastrointestinal:  Negative for abdominal pain, acid reflux, constipation, diarrhea, heartburn and vomiting.     Genitourinary: Negative for difficulty urinating, sexual problems and frequent urination.     Musc: Negative for aching joints, aching muscles, back pain and neck pain.     Skin: Negative for rash.     Allergy: Positive for seasonal allergies. Negative for food allergies.     Endocrine:  Negative for cold intolerance and heat intolerance.      Neurological: Positive for headaches. Negative for dizziness, light-headedness, seizures and tremors.      Hematologic: Negative for bruises/bleeds easily.      Psychiatric: Negative for decreased concentration, depression, nervous/anxious and sleep disturbance.                Objective:      Physical Exam  Vitals and nursing note reviewed.   Constitutional:       General: He is awake.      Appearance: Normal appearance. He is well-developed, well-groomed and overweight.   HENT:      Head: Normocephalic.      Jaw: There is normal jaw occlusion.      Salivary Glands: Right salivary gland is not diffusely enlarged or tender. Left salivary gland is not diffusely enlarged or tender.      Right Ear: Ear canal and external ear normal. Decreased hearing noted. There is impacted cerumen. Tympanic membrane is retracted.      Left Ear: Ear canal and external ear normal. Decreased hearing noted. There is impacted cerumen. Tympanic membrane is retracted.      Nose: Septal deviation (to the left), mucosal edema (cyanotic, boggy inferior turbinates bilaterally) and rhinorrhea (clear mucus bilaterally) present. No nasal deformity. Rhinorrhea is clear.      Right Turbinates: Enlarged and pale.      Left Turbinates: Enlarged and pale.      Mouth/Throat:      Lips: No lesions.      Mouth: Mucous membranes are moist. No oral lesions.      Dentition: No gum lesions.      Tongue: No lesions.      Palate: No mass and lesions.      Pharynx: Oropharynx is clear. Uvula midline.      Tonsils: 3+ on the right. 3+ on the left.   Eyes:      General: Lids are normal.         Right eye: No discharge.         Left eye: No discharge.      Conjunctiva/sclera:      Right eye: Right conjunctiva is injected. No exudate.     Left eye: Left conjunctiva is injected. No exudate.     Pupils: Pupils are equal, round, and reactive to light.   Neck:      Thyroid: No thyroid mass or thyromegaly.       Trachea: Trachea normal. No tracheal deviation.   Cardiovascular:      Rate and Rhythm: Normal rate and regular rhythm.      Pulses: Normal pulses.      Heart sounds: Normal heart sounds.   Pulmonary:      Effort: Pulmonary effort is normal.      Breath sounds: Normal breath sounds. No stridor. No decreased breath sounds, wheezing, rhonchi or rales.   Abdominal:      General: Bowel sounds are normal.      Palpations: Abdomen is soft.      Tenderness: There is no abdominal tenderness.   Musculoskeletal:      Cervical back: Neck supple. No muscular tenderness. Decreased range of motion.   Lymphadenopathy:      Head:      Right side of head: No submental, submandibular, preauricular, posterior auricular or occipital adenopathy.      Left side of head: No submental, submandibular, preauricular, posterior auricular or occipital adenopathy.      Cervical: No cervical adenopathy.   Skin:     General: Skin is warm and dry.      Findings: No petechiae or rash.      Nails: There is no clubbing.   Neurological:      Mental Status: He is alert and oriented to person, place, and time.      Cranial Nerves: No cranial nerve deficit.      Sensory: No sensory deficit.      Gait: Gait normal.   Psychiatric:         Speech: Speech normal.         Behavior: Behavior normal. Behavior is cooperative.         Thought Content: Thought content normal.         Judgment: Judgment normal.     Procedure-cerumen impactions removed bilaterally using a wire loop.  The patient tolerated procedure well was discharged post procedure          Assessment:       1. Laryngopharyngeal reflux (LPR)    2. Phlegm in throat    3. Throat clearing    4. Allergic rhinitis, unspecified seasonality, unspecified trigger    5. PND (post-nasal drip)    6. Nasal septal deviation    7. Sensorineural hearing loss (SNHL) of both ears    8. Hearing aid worn    9. Bilateral impacted cerumen                Plan:       The patient is doing very well with his current drug  regimen.  He will continue present meds until he recheck here in 3 months.  At that visit he will need undergo a flexible nasolaryngoscopy.                    DISCLAIMER: This note was prepared with [employer] voice recognition transcription software. Garbled syntax, mangled pronouns, and other bizarre constructions may be attributed to that software system. While efforts were made to correct any mistakes made by this voice recognition program, some errors and/or omissions may remain in the note that were missed when the note was originally created.

## 2024-08-19 ENCOUNTER — OFFICE VISIT (OUTPATIENT)
Dept: OTOLARYNGOLOGY | Facility: CLINIC | Age: 69
End: 2024-08-19
Payer: MEDICARE

## 2024-08-19 VITALS
SYSTOLIC BLOOD PRESSURE: 108 MMHG | OXYGEN SATURATION: 93 % | DIASTOLIC BLOOD PRESSURE: 58 MMHG | HEART RATE: 76 BPM | BODY MASS INDEX: 25.72 KG/M2 | WEIGHT: 174.19 LBS

## 2024-08-19 DIAGNOSIS — R09.82 PND (POST-NASAL DRIP): ICD-10-CM

## 2024-08-19 DIAGNOSIS — K21.9 LARYNGOPHARYNGEAL REFLUX (LPR): Primary | ICD-10-CM

## 2024-08-19 DIAGNOSIS — Z97.4 HEARING AID WORN: ICD-10-CM

## 2024-08-19 DIAGNOSIS — R09.89 PHLEGM IN THROAT: ICD-10-CM

## 2024-08-19 DIAGNOSIS — J30.9 ALLERGIC RHINITIS, UNSPECIFIED SEASONALITY, UNSPECIFIED TRIGGER: ICD-10-CM

## 2024-08-19 DIAGNOSIS — R09.89 THROAT CLEARING: ICD-10-CM

## 2024-08-19 DIAGNOSIS — H90.3 SENSORINEURAL HEARING LOSS (SNHL) OF BOTH EARS: ICD-10-CM

## 2024-08-19 DIAGNOSIS — J34.2 NASAL SEPTAL DEVIATION: ICD-10-CM

## 2024-08-19 DIAGNOSIS — H61.23 BILATERAL IMPACTED CERUMEN: ICD-10-CM

## 2024-08-19 PROCEDURE — 4010F ACE/ARB THERAPY RXD/TAKEN: CPT | Mod: CPTII,S$GLB,, | Performed by: SPECIALIST

## 2024-08-19 PROCEDURE — 1126F AMNT PAIN NOTED NONE PRSNT: CPT | Mod: CPTII,S$GLB,, | Performed by: SPECIALIST

## 2024-08-19 PROCEDURE — 99999 PR PBB SHADOW E&M-EST. PATIENT-LVL III: CPT | Mod: PBBFAC,,, | Performed by: SPECIALIST

## 2024-08-19 PROCEDURE — 1160F RVW MEDS BY RX/DR IN RCRD: CPT | Mod: CPTII,S$GLB,, | Performed by: SPECIALIST

## 2024-08-19 PROCEDURE — 3008F BODY MASS INDEX DOCD: CPT | Mod: CPTII,S$GLB,, | Performed by: SPECIALIST

## 2024-08-19 PROCEDURE — 3074F SYST BP LT 130 MM HG: CPT | Mod: CPTII,S$GLB,, | Performed by: SPECIALIST

## 2024-08-19 PROCEDURE — 3288F FALL RISK ASSESSMENT DOCD: CPT | Mod: CPTII,S$GLB,, | Performed by: SPECIALIST

## 2024-08-19 PROCEDURE — 1101F PT FALLS ASSESS-DOCD LE1/YR: CPT | Mod: CPTII,S$GLB,, | Performed by: SPECIALIST

## 2024-08-19 PROCEDURE — 3078F DIAST BP <80 MM HG: CPT | Mod: CPTII,S$GLB,, | Performed by: SPECIALIST

## 2024-08-19 PROCEDURE — 69210 REMOVE IMPACTED EAR WAX UNI: CPT | Mod: S$GLB,,, | Performed by: SPECIALIST

## 2024-08-19 PROCEDURE — 99213 OFFICE O/P EST LOW 20 MIN: CPT | Mod: 25,S$GLB,, | Performed by: SPECIALIST

## 2024-08-19 PROCEDURE — 1159F MED LIST DOCD IN RCRD: CPT | Mod: CPTII,S$GLB,, | Performed by: SPECIALIST

## 2024-08-20 NOTE — PROCEDURES
"Ear Cerumen Removal    Date/Time: 8/19/2024 3:00 PM    Performed by: BERNARDO Esquivel MD  Authorized by: BERNARDO Esquivel MD    Time out: Immediately prior to procedure a "time out" was called to verify the correct patient, procedure, equipment, support staff and site/side marked as required.    Consent Done?:  Yes (Verbal)    Local anesthetic:  None  Location details:  Both ears  Procedure type comment:  Wire loop  Cerumen  Removal Results:  Cerumen completely removed  Patient tolerance:  Patient tolerated the procedure well with no immediate complications    "

## 2024-08-27 ENCOUNTER — HOSPITAL ENCOUNTER (OUTPATIENT)
Dept: RADIOLOGY | Facility: HOSPITAL | Age: 69
Discharge: HOME | End: 2024-08-27
Attending: INTERNAL MEDICINE
Payer: MEDICARE

## 2024-08-27 DIAGNOSIS — R19.09 LEFT GROIN MASS: ICD-10-CM

## 2024-08-27 PROCEDURE — 76882 US LMTD JT/FCL EVL NVASC XTR: CPT | Mod: TC,LT

## 2024-11-19 ENCOUNTER — OFFICE VISIT (OUTPATIENT)
Dept: OTOLARYNGOLOGY | Facility: CLINIC | Age: 69
End: 2024-11-19
Payer: MEDICARE

## 2024-11-19 VITALS
WEIGHT: 239.44 LBS | OXYGEN SATURATION: 98 % | DIASTOLIC BLOOD PRESSURE: 57 MMHG | SYSTOLIC BLOOD PRESSURE: 111 MMHG | HEART RATE: 63 BPM | BODY MASS INDEX: 35.36 KG/M2

## 2024-11-19 DIAGNOSIS — J30.9 ALLERGIC RHINITIS, UNSPECIFIED SEASONALITY, UNSPECIFIED TRIGGER: ICD-10-CM

## 2024-11-19 DIAGNOSIS — R09.82 PND (POST-NASAL DRIP): ICD-10-CM

## 2024-11-19 DIAGNOSIS — R09.89 PHLEGM IN THROAT: ICD-10-CM

## 2024-11-19 DIAGNOSIS — K21.9 LARYNGOPHARYNGEAL REFLUX (LPR): Primary | ICD-10-CM

## 2024-11-19 DIAGNOSIS — H90.3 SENSORINEURAL HEARING LOSS (SNHL) OF BOTH EARS: ICD-10-CM

## 2024-11-19 DIAGNOSIS — Z97.4 HEARING AID WORN: ICD-10-CM

## 2024-11-19 DIAGNOSIS — R09.89 THROAT CLEARING: ICD-10-CM

## 2024-11-19 DIAGNOSIS — J34.2 NASAL SEPTAL DEVIATION: ICD-10-CM

## 2024-11-19 PROCEDURE — 3066F NEPHROPATHY DOC TX: CPT | Mod: CPTII,S$GLB,, | Performed by: SPECIALIST

## 2024-11-19 PROCEDURE — 3008F BODY MASS INDEX DOCD: CPT | Mod: CPTII,S$GLB,, | Performed by: SPECIALIST

## 2024-11-19 PROCEDURE — 31575 DIAGNOSTIC LARYNGOSCOPY: CPT | Mod: S$GLB,,, | Performed by: SPECIALIST

## 2024-11-19 PROCEDURE — 3078F DIAST BP <80 MM HG: CPT | Mod: CPTII,S$GLB,, | Performed by: SPECIALIST

## 2024-11-19 PROCEDURE — 3061F NEG MICROALBUMINURIA REV: CPT | Mod: CPTII,S$GLB,, | Performed by: SPECIALIST

## 2024-11-19 PROCEDURE — 1101F PT FALLS ASSESS-DOCD LE1/YR: CPT | Mod: CPTII,S$GLB,, | Performed by: SPECIALIST

## 2024-11-19 PROCEDURE — 99214 OFFICE O/P EST MOD 30 MIN: CPT | Mod: 25,S$GLB,, | Performed by: SPECIALIST

## 2024-11-19 PROCEDURE — 99999 PR PBB SHADOW E&M-EST. PATIENT-LVL IV: CPT | Mod: PBBFAC,,, | Performed by: SPECIALIST

## 2024-11-19 PROCEDURE — 4010F ACE/ARB THERAPY RXD/TAKEN: CPT | Mod: CPTII,S$GLB,, | Performed by: SPECIALIST

## 2024-11-19 PROCEDURE — 1126F AMNT PAIN NOTED NONE PRSNT: CPT | Mod: CPTII,S$GLB,, | Performed by: SPECIALIST

## 2024-11-19 PROCEDURE — 3074F SYST BP LT 130 MM HG: CPT | Mod: CPTII,S$GLB,, | Performed by: SPECIALIST

## 2024-11-19 PROCEDURE — 1160F RVW MEDS BY RX/DR IN RCRD: CPT | Mod: CPTII,S$GLB,, | Performed by: SPECIALIST

## 2024-11-19 PROCEDURE — 1159F MED LIST DOCD IN RCRD: CPT | Mod: CPTII,S$GLB,, | Performed by: SPECIALIST

## 2024-11-19 PROCEDURE — 3288F FALL RISK ASSESSMENT DOCD: CPT | Mod: CPTII,S$GLB,, | Performed by: SPECIALIST

## 2024-11-19 NOTE — PROCEDURES
"Laryngoscopy    Date/Time: 11/19/2024 3:00 PM    Performed by: BERNARDO Esquivel MD  Authorized by: BERNRADO Esquivel MD    Time out: Immediately prior to procedure a "time out" was called to verify the correct patient, procedure, equipment, support staff and site/side marked as required.    Consent Done?:  Yes (Verbal)  Anesthesia:     Local anesthetic:  4% Xylocaine spray with Benji-Synephrine    Patient tolerance:  Patient tolerated the procedure well with no immediate complications    Decongestion performed?: Yes    Laryngoscopy:     Areas examined:  Larynx, hypopharynx, oropharynx, nasopharynx, nasal cavities and vocal cords    Laryngoscope size:  4 mm (Flexible video nasolaryngoscopy)  Nose External:      No external nasal deformity  Nose Intranasal:      Mucosa no polyps     Mucosa ulcers not present     No mucosa lesions present (clear mucus in the nasal passages bilaterally)     Septum gross deformity (septum deviated high to the right and on the floor of the nose to the left)     Enlarged turbinates (Inferior turbinates enlarged bilaterally)  Nasopharynx:      No mucosa lesions     Adenoids not present     Posterior choanae patent     Eustachian tube patent  Larynx/hypopharynx:      No epiglottis lesions     No epiglottis edema     No AE folds lesions     No vocal cord polyps     Equal and normal bilateral (vocal cords move symmetrically, no mucosal lesions)     No hypopharynx lesions     No piriform sinus pooling     No piriform sinus lesions     Post cricoid edema (minimal to mild, improved from last endoscopy in May 20, 2024)     Post cricoid erythema (minimal to mild, improved from last endoscopy in M 20, 2024)     Flexible video nasolaryngoscopy-nasal septal deviation and nasal changes of allergic rhinitis; laryngeal changes consistent with laryngal pharyngeal reflux that is improving on b.i.d. H2 agonist therapy    "

## 2024-11-19 NOTE — PROGRESS NOTES
"Subjective:       Patient ID: Pratik Laird is a 69 y.o. male.    Chief Complaint: No chief complaint on file.    The patient is returning for a 3 month follow-up visit.  There are multiple issues to discuss:  1. Laryngopharyngeal reflux::  The patient has noticed significant improvement in his symptoms.  He rarely has throat clearing or phlegm in the throat.  He is not having anymore choking spells.  Globus sensation has resolved.  He is taking famotidine twice daily.     2. Allergic rhinitis:   Nasal secretions are clear.  He states, "I think I have a cold."Nasal secretions are clear.  He is not having any fever.  He is using loratadine and ipratropium bromide nasal sprays, which generally control symptoms  3. Sensorineural hearing loss/wears hearing aids:  He wears his hearing aids pretty much all day everyday in both ears.  He has not noticed any significant change in his hearing.          Review of Systems     Constitutional: Positive for fatigue.  Negative for appetite change, chills, fever and unexpected weight loss.      HENT: Positive for hearing loss (wears hearing aids bilaterally), postnasal drip, runny nose, sinus pressure, sore throat, stuffy nose and trouble swallowing.  Negative for ear discharge, ear infection, ear pain, facial swelling, mouth sores, nosebleeds, ringing in the ears, sinus infection, tonsil infection, dental problems and voice change.      Eyes:  Negative for change in eyesight, eye drainage, eye itching and photophobia.     Respiratory:  Positive for cough. Negative for shortness of breath, sleep apnea, snoring and wheezing.      Cardiovascular:  Negative for chest pain, foot swelling, irregular heartbeat and swollen veins.     Gastrointestinal:  Negative for abdominal pain, acid reflux, constipation, diarrhea, heartburn and vomiting.     Genitourinary: Negative for difficulty urinating, sexual problems and frequent urination.     Musc: Negative for aching joints, aching " muscles, back pain and neck pain.     Skin: Negative for rash.     Allergy: Positive for seasonal allergies. Negative for food allergies.     Endocrine: Negative for cold intolerance and heat intolerance.      Neurological: Positive for headaches. Negative for dizziness, light-headedness, seizures and tremors.      Hematologic: Negative for bruises/bleeds easily and swollen glands.      Psychiatric: Negative for decreased concentration, depression, nervous/anxious and sleep disturbance.                Objective:      Physical Exam  Vitals and nursing note reviewed.   Constitutional:       General: He is awake.      Appearance: Normal appearance. He is well-developed, well-groomed and overweight.   HENT:      Head: Normocephalic.      Jaw: There is normal jaw occlusion.      Salivary Glands: Right salivary gland is not diffusely enlarged or tender. Left salivary gland is not diffusely enlarged or tender.      Right Ear: Ear canal and external ear normal. Decreased hearing noted. There is impacted cerumen. Tympanic membrane is retracted.      Left Ear: Ear canal and external ear normal. Decreased hearing noted. There is impacted cerumen. Tympanic membrane is retracted.      Nose: Septal deviation (to the left), mucosal edema (cyanotic, boggy inferior turbinates bilaterally) and rhinorrhea (clear mucus bilaterally) present. No nasal deformity. Rhinorrhea is clear.      Right Turbinates: Enlarged and pale.      Left Turbinates: Enlarged and pale.      Mouth/Throat:      Lips: No lesions.      Mouth: Mucous membranes are moist. No oral lesions.      Dentition: No gum lesions.      Tongue: No lesions.      Palate: No mass and lesions.      Pharynx: Oropharynx is clear. Uvula midline.      Tonsils: 3+ on the right. 3+ on the left.   Eyes:      General: Lids are normal.         Right eye: No discharge.         Left eye: No discharge.      Conjunctiva/sclera:      Right eye: Right conjunctiva is injected. No exudate.      Left eye: Left conjunctiva is injected. No exudate.     Pupils: Pupils are equal, round, and reactive to light.   Neck:      Thyroid: No thyroid mass or thyromegaly.      Trachea: Trachea normal. No tracheal deviation.   Cardiovascular:      Rate and Rhythm: Normal rate and regular rhythm.      Pulses: Normal pulses.      Heart sounds: Normal heart sounds.   Pulmonary:      Effort: Pulmonary effort is normal.      Breath sounds: Normal breath sounds. No stridor. No decreased breath sounds, wheezing, rhonchi or rales.   Abdominal:      General: Bowel sounds are normal.      Palpations: Abdomen is soft.      Tenderness: There is no abdominal tenderness.   Musculoskeletal:      Cervical back: Neck supple. No muscular tenderness. Decreased range of motion.   Lymphadenopathy:      Head:      Right side of head: No submental, submandibular, preauricular, posterior auricular or occipital adenopathy.      Left side of head: No submental, submandibular, preauricular, posterior auricular or occipital adenopathy.      Cervical: No cervical adenopathy.   Skin:     General: Skin is warm and dry.      Findings: No petechiae or rash.      Nails: There is no clubbing.   Neurological:      Mental Status: He is alert and oriented to person, place, and time.      Cranial Nerves: No cranial nerve deficit.      Sensory: No sensory deficit.      Gait: Gait normal.   Psychiatric:         Speech: Speech normal.         Behavior: Behavior normal. Behavior is cooperative.         Thought Content: Thought content normal.         Judgment: Judgment normal.     Flexible Nasolaryngoscopy:  Nasal septal deviation and nasal changes allergic rhinitis; laryngeal changes consistent with laryngal pharyngeal reflux that is improving on b.i.d. H2 agonist therapy    Laryngeal pictures:                Nasopharynx/torus tubarius pictures:              Right nasal passage pictures:          Left nasal passage pictures:                 Assessment:       1.  Laryngopharyngeal reflux (LPR)    2. Phlegm in throat    3. Throat clearing    4. Allergic rhinitis, unspecified seasonality, unspecified trigger    5. PND (post-nasal drip)    6. Nasal septal deviation    7. Sensorineural hearing loss (SNHL) of both ears    8. Hearing aid worn            Plan:       I will have the patient use his loratadine every morning.  He will supplement on an as-needed basis when he is having major runny nose or postnasal drip with ipratropium bromide.  I will recheck him in 3 months, or sooner an as-needed basis                    DISCLAIMER: This note was prepared with [employer] voice recognition transcription software. Garbled syntax, mangled pronouns, and other bizarre constructions may be attributed to that software system. While efforts were made to correct any mistakes made by this voice recognition program, some errors and/or omissions may remain in the note that were missed when the note was originally created.

## 2024-11-22 PROBLEM — R19.09 LEFT GROIN MASS: Status: ACTIVE | Noted: 2024-11-22

## 2024-11-22 PROBLEM — R10.32 GROIN PAIN, LEFT: Status: ACTIVE | Noted: 2024-11-22

## 2024-11-25 ENCOUNTER — OFFICE VISIT (OUTPATIENT)
Dept: SURGERY | Facility: CLINIC | Age: 69
End: 2024-11-25
Payer: MEDICARE

## 2024-11-25 VITALS
BODY MASS INDEX: 35.02 KG/M2 | WEIGHT: 236.44 LBS | HEART RATE: 62 BPM | SYSTOLIC BLOOD PRESSURE: 103 MMHG | HEIGHT: 69 IN | DIASTOLIC BLOOD PRESSURE: 67 MMHG

## 2024-11-25 DIAGNOSIS — R19.09 LEFT GROIN MASS: ICD-10-CM

## 2024-11-25 DIAGNOSIS — R10.32 GROIN PAIN, LEFT: ICD-10-CM

## 2024-11-25 DIAGNOSIS — K40.90 LEFT INGUINAL HERNIA: Primary | ICD-10-CM

## 2024-11-25 PROCEDURE — 99999 PR PBB SHADOW E&M-EST. PATIENT-LVL IV: CPT | Mod: PBBFAC,,, | Performed by: STUDENT IN AN ORGANIZED HEALTH CARE EDUCATION/TRAINING PROGRAM

## 2024-11-25 PROCEDURE — 3061F NEG MICROALBUMINURIA REV: CPT | Mod: CPTII,S$GLB,, | Performed by: STUDENT IN AN ORGANIZED HEALTH CARE EDUCATION/TRAINING PROGRAM

## 2024-11-25 PROCEDURE — 1125F AMNT PAIN NOTED PAIN PRSNT: CPT | Mod: CPTII,S$GLB,, | Performed by: STUDENT IN AN ORGANIZED HEALTH CARE EDUCATION/TRAINING PROGRAM

## 2024-11-25 PROCEDURE — 3078F DIAST BP <80 MM HG: CPT | Mod: CPTII,S$GLB,, | Performed by: STUDENT IN AN ORGANIZED HEALTH CARE EDUCATION/TRAINING PROGRAM

## 2024-11-25 PROCEDURE — 3008F BODY MASS INDEX DOCD: CPT | Mod: CPTII,S$GLB,, | Performed by: STUDENT IN AN ORGANIZED HEALTH CARE EDUCATION/TRAINING PROGRAM

## 2024-11-25 PROCEDURE — 3074F SYST BP LT 130 MM HG: CPT | Mod: CPTII,S$GLB,, | Performed by: STUDENT IN AN ORGANIZED HEALTH CARE EDUCATION/TRAINING PROGRAM

## 2024-11-25 PROCEDURE — 4010F ACE/ARB THERAPY RXD/TAKEN: CPT | Mod: CPTII,S$GLB,, | Performed by: STUDENT IN AN ORGANIZED HEALTH CARE EDUCATION/TRAINING PROGRAM

## 2024-11-25 PROCEDURE — 3066F NEPHROPATHY DOC TX: CPT | Mod: CPTII,S$GLB,, | Performed by: STUDENT IN AN ORGANIZED HEALTH CARE EDUCATION/TRAINING PROGRAM

## 2024-11-25 PROCEDURE — 1101F PT FALLS ASSESS-DOCD LE1/YR: CPT | Mod: CPTII,S$GLB,, | Performed by: STUDENT IN AN ORGANIZED HEALTH CARE EDUCATION/TRAINING PROGRAM

## 2024-11-25 PROCEDURE — 3288F FALL RISK ASSESSMENT DOCD: CPT | Mod: CPTII,S$GLB,, | Performed by: STUDENT IN AN ORGANIZED HEALTH CARE EDUCATION/TRAINING PROGRAM

## 2024-11-25 PROCEDURE — 1159F MED LIST DOCD IN RCRD: CPT | Mod: CPTII,S$GLB,, | Performed by: STUDENT IN AN ORGANIZED HEALTH CARE EDUCATION/TRAINING PROGRAM

## 2024-11-25 PROCEDURE — 99204 OFFICE O/P NEW MOD 45 MIN: CPT | Mod: S$GLB,,, | Performed by: STUDENT IN AN ORGANIZED HEALTH CARE EDUCATION/TRAINING PROGRAM

## 2024-11-25 PROCEDURE — 1160F RVW MEDS BY RX/DR IN RCRD: CPT | Mod: CPTII,S$GLB,, | Performed by: STUDENT IN AN ORGANIZED HEALTH CARE EDUCATION/TRAINING PROGRAM

## 2024-11-25 NOTE — H&P (VIEW-ONLY)
Patient ID: Pratik Laird is a 69 y.o. male.    Chief Complaint: No chief complaint on file.      HPI:  HPI  69M with left groin bulge for 3-4 months. Comes and goes. Lately has been having some pain in the area. Just returned from trip to [place] where he did a lot of walking, climbing. Seems to have aggravated it. Denies CP, SOB.   Hx of coronary stents placed in 2009. No OAC. Follows with cards at .   No hx of ab surgery.   Denies right sided symptoms  No voiding or obstructive issues.         Review of Systems   Constitutional:  Negative for chills, diaphoresis and fever.   HENT:  Negative for trouble swallowing.    Respiratory:  Negative for cough, shortness of breath, wheezing and stridor.    Cardiovascular:  Negative for chest pain and palpitations.   Gastrointestinal:  Negative for abdominal distention, abdominal pain, blood in stool, diarrhea, nausea and vomiting.   Endocrine: Negative for cold intolerance and heat intolerance.   Genitourinary:  Negative for difficulty urinating.   Musculoskeletal:  Negative for back pain.   Skin:  Negative for rash.   Allergic/Immunologic: Negative for immunocompromised state.   Neurological:  Negative for dizziness, syncope and numbness.   Hematological:  Negative for adenopathy.   Psychiatric/Behavioral:  Negative for agitation.        Current Outpatient Medications   Medication Sig Dispense Refill    aspirin (ECOTRIN) 81 MG EC tablet Take 81 mg by mouth once daily.      atorvastatin (LIPITOR) 40 MG tablet TAKE 1 TABLET BY MOUTH ONCE  DAILY 90 tablet 3    famotidine (PEPCID) 20 MG tablet Take 1 tablet (20 mg total) by mouth 2 (two) times daily. 180 tablet 3    ipratropium (ATROVENT) 21 mcg (0.03 %) nasal spray USE 2 SPRAYS NASALLY TWICE  DAILY MAY USE MORE OFTEN IF NEEDED 90 mL 2    lisinopriL (PRINIVIL,ZESTRIL) 5 MG tablet TAKE 1 TABLET BY MOUTH TWICE  DAILY 180 tablet 3    nitroGLYCERIN (NITROSTAT) 0.4 MG SL tablet DISSOLVE 1 TABLET UNDER THE  TONGUE EVERY 5  MINUTES AS NEEDED FOR CHEST PAIN. MAX OF 3 TABLETS IN 15 MINUTES. CALL 911 IF PAIN  PERSISTS. 100 tablet 3    loratadine (CLARITIN) 10 mg tablet Take 1 tablet (10 mg total) by mouth once daily. 90 tablet 3     No current facility-administered medications for this visit.       Review of patient's allergies indicates:  No Known Allergies    Past Medical History:   Diagnosis Date    Back pain     Hyperglycemia     Hyperlipidemia     Hypertension     Myocardial infarction     Nuclear sclerosis - Both Eyes 1/10/2013    Polyp of colon     Tennis elbow 8/6/2014       Past Surgical History:   Procedure Laterality Date    CATARACT EXTRACTION Left 6/26/2015    Dr. Larsen    CORONARY ANGIOPLASTY WITH STENT PLACEMENT         Social History     Socioeconomic History    Marital status: Single   Tobacco Use    Smoking status: Never    Smokeless tobacco: Never   Substance and Sexual Activity    Alcohol use: No    Drug use: No    Sexual activity: Never     Social Drivers of Health     Financial Resource Strain: Low Risk  (11/18/2024)    Overall Financial Resource Strain (CARDIA)     Difficulty of Paying Living Expenses: Not hard at all   Food Insecurity: No Food Insecurity (11/18/2024)    Hunger Vital Sign     Worried About Running Out of Food in the Last Year: Never true     Ran Out of Food in the Last Year: Never true   Physical Activity: Insufficiently Active (11/18/2024)    Exercise Vital Sign     Days of Exercise per Week: 7 days     Minutes of Exercise per Session: 20 min   Stress: No Stress Concern Present (11/18/2024)    [employer] [place] [employer] - Occupational Stress Questionnaire     Feeling of Stress : Only a little   Housing Stability: Unknown (11/18/2024)    Housing Stability Vital Sign     Unable to Pay for Housing in the Last Year: No       Vitals:    11/25/24 0831   BP: 103/67   Pulse: 62       Physical Exam  Constitutional:       General: He is not in acute distress.  HENT:      Head:  Normocephalic and atraumatic.   Eyes:      General: No scleral icterus.  Cardiovascular:      Rate and Rhythm: Normal rate.   Pulmonary:      Effort: Pulmonary effort is normal. No respiratory distress.      Breath sounds: No stridor.   Abdominal:      Palpations: Abdomen is soft.      Tenderness: There is no abdominal tenderness.      Hernia: A hernia is present.      Comments: [facility]  No obvious RIH   Lymphadenopathy:      Cervical: No cervical adenopathy.   Skin:     General: Skin is warm.      Findings: No erythema.   Neurological:      Mental Status: He is alert and oriented to person, place, and time.   Psychiatric:         Behavior: Behavior normal.     Body mass index is 34.92 kg/m².  PSA 0.5  Glucose 102 on recent labs  US did not show any hernia or mass    Assessment & Plan:  69M with [facility]  Plan robot [facility], poss bilateral poss open  December 17th  Consents done

## 2024-11-25 NOTE — PROGRESS NOTES
Patient ID: Pratik Laird is a 69 y.o. male.    Chief Complaint: No chief complaint on file.      HPI:  HPI  69M with left groin bulge for 3-4 months. Comes and goes. Lately has been having some pain in the area. Just returned from trip to [place] where he did a lot of walking, climbing. Seems to have aggravated it. Denies CP, SOB.   Hx of coronary stents placed in 2009. No OAC. Follows with cards at .   No hx of ab surgery.   Denies right sided symptoms  No voiding or obstructive issues.         Review of Systems   Constitutional:  Negative for chills, diaphoresis and fever.   HENT:  Negative for trouble swallowing.    Respiratory:  Negative for cough, shortness of breath, wheezing and stridor.    Cardiovascular:  Negative for chest pain and palpitations.   Gastrointestinal:  Negative for abdominal distention, abdominal pain, blood in stool, diarrhea, nausea and vomiting.   Endocrine: Negative for cold intolerance and heat intolerance.   Genitourinary:  Negative for difficulty urinating.   Musculoskeletal:  Negative for back pain.   Skin:  Negative for rash.   Allergic/Immunologic: Negative for immunocompromised state.   Neurological:  Negative for dizziness, syncope and numbness.   Hematological:  Negative for adenopathy.   Psychiatric/Behavioral:  Negative for agitation.        Current Outpatient Medications   Medication Sig Dispense Refill    aspirin (ECOTRIN) 81 MG EC tablet Take 81 mg by mouth once daily.      atorvastatin (LIPITOR) 40 MG tablet TAKE 1 TABLET BY MOUTH ONCE  DAILY 90 tablet 3    famotidine (PEPCID) 20 MG tablet Take 1 tablet (20 mg total) by mouth 2 (two) times daily. 180 tablet 3    ipratropium (ATROVENT) 21 mcg (0.03 %) nasal spray USE 2 SPRAYS NASALLY TWICE  DAILY MAY USE MORE OFTEN IF NEEDED 90 mL 2    lisinopriL (PRINIVIL,ZESTRIL) 5 MG tablet TAKE 1 TABLET BY MOUTH TWICE  DAILY 180 tablet 3    nitroGLYCERIN (NITROSTAT) 0.4 MG SL tablet DISSOLVE 1 TABLET UNDER THE  TONGUE EVERY 5  MINUTES AS NEEDED FOR CHEST PAIN. MAX OF 3 TABLETS IN 15 MINUTES. CALL 911 IF PAIN  PERSISTS. 100 tablet 3    loratadine (CLARITIN) 10 mg tablet Take 1 tablet (10 mg total) by mouth once daily. 90 tablet 3     No current facility-administered medications for this visit.       Review of patient's allergies indicates:  No Known Allergies    Past Medical History:   Diagnosis Date    Back pain     Hyperglycemia     Hyperlipidemia     Hypertension     Myocardial infarction     Nuclear sclerosis - Both Eyes 1/10/2013    Polyp of colon     Tennis elbow 8/6/2014       Past Surgical History:   Procedure Laterality Date    CATARACT EXTRACTION Left 6/26/2015    Dr. Larsen    CORONARY ANGIOPLASTY WITH STENT PLACEMENT         Social History     Socioeconomic History    Marital status: Single   Tobacco Use    Smoking status: Never    Smokeless tobacco: Never   Substance and Sexual Activity    Alcohol use: No    Drug use: No    Sexual activity: Never     Social Drivers of Health     Financial Resource Strain: Low Risk  (11/18/2024)    Overall Financial Resource Strain (CARDIA)     Difficulty of Paying Living Expenses: Not hard at all   Food Insecurity: No Food Insecurity (11/18/2024)    Hunger Vital Sign     Worried About Running Out of Food in the Last Year: Never true     Ran Out of Food in the Last Year: Never true   Physical Activity: Insufficiently Active (11/18/2024)    Exercise Vital Sign     Days of Exercise per Week: 7 days     Minutes of Exercise per Session: 20 min   Stress: No Stress Concern Present (11/18/2024)    [place] [employer] - Occupational Stress Questionnaire     Feeling of Stress : Only a little   Housing Stability: Unknown (11/18/2024)    Housing Stability Vital Sign     Unable to Pay for Housing in the Last Year: No       Vitals:    11/25/24 0831   BP: 103/67   Pulse: 62       Physical Exam  Constitutional:       General: He is not in acute distress.  HENT:      Head:  Normocephalic and atraumatic.   Eyes:      General: No scleral icterus.  Cardiovascular:      Rate and Rhythm: Normal rate.   Pulmonary:      Effort: Pulmonary effort is normal. No respiratory distress.      Breath sounds: No stridor.   Abdominal:      Palpations: Abdomen is soft.      Tenderness: There is no abdominal tenderness.      Hernia: A hernia is present.      Comments: [facility]  No obvious RIH   Lymphadenopathy:      Cervical: No cervical adenopathy.   Skin:     General: Skin is warm.      Findings: No erythema.   Neurological:      Mental Status: He is alert and oriented to person, place, and time.   Psychiatric:         Behavior: Behavior normal.     Body mass index is 34.92 kg/m².  PSA 0.5  Glucose 102 on recent labs  US did not show any hernia or mass    Assessment & Plan:  69M with [facility]  Plan robot [facility], poss bilateral poss open  December 17th  Consents done

## 2024-12-05 ENCOUNTER — TELEPHONE (OUTPATIENT)
Dept: PREADMISSION TESTING | Facility: HOSPITAL | Age: 69
End: 2024-12-05
Payer: MEDICARE

## 2024-12-05 DIAGNOSIS — I25.119 CORONARY ARTERY DISEASE INVOLVING NATIVE CORONARY ARTERY WITH ANGINA PECTORIS, UNSPECIFIED WHETHER NATIVE OR TRANSPLANTED HEART: Chronic | ICD-10-CM

## 2024-12-05 DIAGNOSIS — Z01.818 PRE-OP EVALUATION: Primary | ICD-10-CM

## 2024-12-05 DIAGNOSIS — I10 ESSENTIAL HYPERTENSION: ICD-10-CM

## 2024-12-10 ENCOUNTER — ANESTHESIA EVENT (OUTPATIENT)
Dept: SURGERY | Facility: HOSPITAL | Age: 69
End: 2024-12-10
Payer: MEDICARE

## 2024-12-10 ENCOUNTER — HOSPITAL ENCOUNTER (OUTPATIENT)
Dept: PREADMISSION TESTING | Facility: HOSPITAL | Age: 69
Discharge: HOME | End: 2024-12-10
Attending: NURSE PRACTITIONER
Payer: MEDICARE

## 2024-12-10 ENCOUNTER — PATIENT MESSAGE (OUTPATIENT)
Dept: PREADMISSION TESTING | Facility: HOSPITAL | Age: 69
End: 2024-12-10

## 2024-12-10 NOTE — ANESTHESIA PREPROCEDURE EVALUATION
12/10/2024  Pratik Laird is a 69 y.o., male scheduled for  ROBOTIC REPAIR, HERNIA, INGUINAL (Left: Abdomen) 12/17/24.    Past Medical History:   Diagnosis Date    Back pain     Hyperglycemia     Hyperlipidemia     Hypertension     Myocardial infarction     Nuclear sclerosis - Both Eyes 1/10/2013    Polyp of colon     Tennis elbow 8/6/2014     Past Surgical History:   Procedure Laterality Date    CATARACT EXTRACTION Left 6/26/2015    Dr. Larsen    CORONARY ANGIOPLASTY WITH STENT PLACEMENT       Review of patient's allergies indicates:  No Known Allergies    Current Outpatient Medications   Medication Instructions    aspirin (ECOTRIN) 81 mg, Daily    atorvastatin (LIPITOR) 40 mg, Oral    famotidine (PEPCID) 20 mg, Oral, 2 times daily    ipratropium (ATROVENT) 21 mcg (0.03 %) nasal spray USE 2 SPRAYS NASALLY TWICE  DAILY MAY USE MORE OFTEN IF NEEDED    lisinopriL (PRINIVIL,ZESTRIL) 5 MG tablet TAKE 1 TABLET BY MOUTH TWICE  DAILY    loratadine (CLARITIN) 10 mg, Oral, Daily    nitroGLYCERIN (NITROSTAT) 0.4 MG SL tablet DISSOLVE 1 TABLET UNDER THE  TONGUE EVERY 5 MINUTES AS NEEDED FOR CHEST PAIN. MAX OF 3 TABLETS IN 15 MINUTES. CALL 911 IF PAIN  PERSISTS.       Pre-op Assessment    I have reviewed the Patient Summary Reports.     I have reviewed the Nursing Notes. I have reviewed the NPO Status.   I have reviewed the Medications.     Review of Systems  Anesthesia Hx:  No problems with previous Anesthesia               Denies Personal Hx of Anesthesia complications.                    Social:  Non-Smoker, Social Alcohol Use       Hematology/Oncology:  Hematology Normal      -- Denies Anemia:                  Denies Current/Recent Cancer                Cardiovascular:  Exercise tolerance: good   Denies Pacemaker. Hypertension  Past MI CAD   CABG/stent  Denies Dysrhythmias.  Angina  Denies CHF.    no  hyperlipidemia   ECG has been reviewed.    Functional Capacity good / => 4 METS  Cardiovascular Symptoms: Angina       Coronary Artery Disease:          Hx of Myocardial Infarction                  Hypertension         Pulmonary:  Pulmonary Normal   Denies COPD.  Denies Asthma.   Denies Shortness of breath.   Denies Sleep Apnea.                Renal/:  Chronic Renal Disease, CKD        Kidney Function/Disease             Hepatic/GI:  Hepatic/GI Normal     Denies GERD.    Not Taking GLP-1 Agonists            Musculoskeletal:  Musculoskeletal Normal                Neurological:  Neurology Normal Denies TIA.  Denies CVA.    Denies Seizures.                                Endocrine:  Endocrine Normal          Obesity / BMI > 30      Physical Exam  General: Cooperative and Oriented    Airway:  Mallampati: II   Mouth Opening: Normal  TM Distance: Normal  Tongue: Normal  Neck ROM: Normal ROM    Dental:  Intact      Lab Results   Component Value Date    WBC 5.5 08/21/2024    HGB 13.4 08/21/2024    HCT 40.0 08/21/2024     08/21/2024    CHOL 118 08/21/2024    TRIG 90 08/21/2024    HDL 36 (L) 08/21/2024    ALT 21 08/21/2024    AST 20 08/21/2024     08/21/2024    K 4.5 08/21/2024     08/21/2024    CREATININE 1.23 08/21/2024    BUN 23 08/21/2024    CO2 27 08/21/2024    PSA 0.45 08/04/2023    HGBA1C 5.2 12/02/2015    MICROALBUR 0.4 08/21/2024     Results for orders placed or performed in visit on 10/02/23   EKG 12-lead    Collection Time: 10/02/23  9:05 AM    Narrative    Test Reason : I10,    Vent. Rate : 046 BPM     Atrial Rate : 046 BPM     P-R Int : 178 ms          QRS Dur : 090 ms      QT Int : 440 ms       P-R-T Axes : 031 -16 074 degrees     QTc Int : 385 ms    Sinus bradycardia  Septal infarct ,age undetermined  Abnormal ECG  No previous ECGs available  Confirmed by Kriss Bello MD ([id number]) on 10/3/2023 8:04:25 AM    Referred By: LETICIA ZAMORA           Confirmed By:Kriss Bello MD      TTE COMPLETE W COLOR & DOPPLER 12/4/24  The left ventricular size, thickness and function are normal  Ejection Fraction = 55-60%.  Diastolic parameters are indeterminate.  Mild aortic regurgitation.  There is no mitral regurgitation noted.  There is trace tricuspid regurgitation.  The pulmonary artery systolic pressure could not be determined due to insufficienct TR.     Anesthesia Plan  Type of Anesthesia, risks & benefits discussed:    Anesthesia Type: Gen ETT, Gen Natural Airway  Intra-op Monitoring Plan: Standard ASA Monitors  Post Op Pain Control Plan: multimodal analgesia  Induction:  IV  Airway Plan: Video, Post-Induction  Informed Consent: Informed consent signed with the Patient and all parties understand the risks and agree with anesthesia plan.  All questions answered.   ASA Score: 3  Day of Surgery Review of History & Physical: H&P Update referred to the surgeon/provider.  Anesthesia Plan Notes: Anesthesia consent to be signed prior to surgery 12/17/24      Ready For Surgery From Anesthesia Perspective.     .

## 2024-12-10 NOTE — DISCHARGE INSTRUCTIONS

## 2024-12-10 NOTE — PRE-PROCEDURE INSTRUCTIONS
Arranging a ride home.    Allergies, medical, surgical, family and psychosocial histories reviewed with patient. Periop plan of care reviewed. Preop instructions given, including medications to take and to hold. Hibiclens soap and instructions on use given. Time allotted for questions to be addressed.      Arrival time 0845.\      Please take Famotidine the morning of surgery.+    Surgery instructions reviewed and surgery booklet sent or emailed to the patient via the portal.

## 2024-12-11 ENCOUNTER — LAB VISIT (OUTPATIENT)
Dept: LAB | Facility: HOSPITAL | Age: 69
End: 2024-12-11
Attending: NURSE PRACTITIONER
Payer: MEDICARE

## 2024-12-11 DIAGNOSIS — I10 ESSENTIAL HYPERTENSION: ICD-10-CM

## 2024-12-11 DIAGNOSIS — Z01.818 PRE-OP EVALUATION: ICD-10-CM

## 2024-12-11 DIAGNOSIS — I25.119 CORONARY ARTERY DISEASE INVOLVING NATIVE CORONARY ARTERY WITH ANGINA PECTORIS, UNSPECIFIED WHETHER NATIVE OR TRANSPLANTED HEART: Chronic | ICD-10-CM

## 2024-12-11 LAB
ANION GAP SERPL CALC-SCNC: 10 MMOL/L (ref 8–16)
BUN SERPL-MCNC: 22 MG/DL (ref 8–23)
CALCIUM SERPL-MCNC: 9.8 MG/DL (ref 8.7–10.5)
CHLORIDE SERPL-SCNC: 106 MMOL/L (ref 95–110)
CO2 SERPL-SCNC: 25 MMOL/L (ref 23–29)
CREAT SERPL-MCNC: 1.3 MG/DL (ref 0.5–1.4)
EST. GFR  (NO RACE VARIABLE): 59 ML/MIN/1.73 M^2
GLUCOSE SERPL-MCNC: 98 MG/DL (ref 70–110)
OHS QRS DURATION: 80 MS
OHS QTC CALCULATION: 394 MS
POTASSIUM SERPL-SCNC: 4.6 MMOL/L (ref 3.5–5.1)
SODIUM SERPL-SCNC: 141 MMOL/L (ref 136–145)

## 2024-12-11 PROCEDURE — 93010 ELECTROCARDIOGRAM REPORT: CPT | Mod: ,,, | Performed by: INTERNAL MEDICINE

## 2024-12-11 PROCEDURE — 80048 BASIC METABOLIC PNL TOTAL CA: CPT | Performed by: NURSE PRACTITIONER

## 2024-12-11 PROCEDURE — 36415 COLL VENOUS BLD VENIPUNCTURE: CPT | Performed by: NURSE PRACTITIONER

## 2024-12-11 PROCEDURE — 93005 ELECTROCARDIOGRAM TRACING: CPT

## 2024-12-17 ENCOUNTER — HOSPITAL ENCOUNTER (OUTPATIENT)
Facility: HOSPITAL | Age: 69
Discharge: HOME | End: 2024-12-17
Attending: STUDENT IN AN ORGANIZED HEALTH CARE EDUCATION/TRAINING PROGRAM | Admitting: STUDENT IN AN ORGANIZED HEALTH CARE EDUCATION/TRAINING PROGRAM
Payer: MEDICARE

## 2024-12-17 ENCOUNTER — ANESTHESIA (OUTPATIENT)
Dept: SURGERY | Facility: HOSPITAL | Age: 69
End: 2024-12-17
Payer: MEDICARE

## 2024-12-17 VITALS
OXYGEN SATURATION: 97 % | DIASTOLIC BLOOD PRESSURE: 63 MMHG | HEART RATE: 59 BPM | SYSTOLIC BLOOD PRESSURE: 121 MMHG | TEMPERATURE: 98 F | RESPIRATION RATE: 18 BRPM

## 2024-12-17 DIAGNOSIS — K40.90 LEFT INGUINAL HERNIA: Primary | ICD-10-CM

## 2024-12-17 PROCEDURE — 71000033 HC RECOVERY, INTIAL HOUR: Performed by: STUDENT IN AN ORGANIZED HEALTH CARE EDUCATION/TRAINING PROGRAM

## 2024-12-17 PROCEDURE — 63600175 PHARM REV CODE 636 W HCPCS: Mod: JG | Performed by: STUDENT IN AN ORGANIZED HEALTH CARE EDUCATION/TRAINING PROGRAM

## 2024-12-17 PROCEDURE — 63600175 PHARM REV CODE 636 W HCPCS: Performed by: STUDENT IN AN ORGANIZED HEALTH CARE EDUCATION/TRAINING PROGRAM

## 2024-12-17 PROCEDURE — 36000711: Performed by: STUDENT IN AN ORGANIZED HEALTH CARE EDUCATION/TRAINING PROGRAM

## 2024-12-17 PROCEDURE — 37000009 HC ANESTHESIA EA ADD 15 MINS: Performed by: STUDENT IN AN ORGANIZED HEALTH CARE EDUCATION/TRAINING PROGRAM

## 2024-12-17 PROCEDURE — 37000008 HC ANESTHESIA 1ST 15 MINUTES: Performed by: STUDENT IN AN ORGANIZED HEALTH CARE EDUCATION/TRAINING PROGRAM

## 2024-12-17 PROCEDURE — C1781 MESH (IMPLANTABLE): HCPCS | Performed by: STUDENT IN AN ORGANIZED HEALTH CARE EDUCATION/TRAINING PROGRAM

## 2024-12-17 PROCEDURE — 71000039 HC RECOVERY, EACH ADD'L HOUR: Performed by: STUDENT IN AN ORGANIZED HEALTH CARE EDUCATION/TRAINING PROGRAM

## 2024-12-17 PROCEDURE — 71000016 HC POSTOP RECOV ADDL HR: Performed by: STUDENT IN AN ORGANIZED HEALTH CARE EDUCATION/TRAINING PROGRAM

## 2024-12-17 PROCEDURE — 71000015 HC POSTOP RECOV 1ST HR: Performed by: STUDENT IN AN ORGANIZED HEALTH CARE EDUCATION/TRAINING PROGRAM

## 2024-12-17 PROCEDURE — 36000710: Performed by: STUDENT IN AN ORGANIZED HEALTH CARE EDUCATION/TRAINING PROGRAM

## 2024-12-17 PROCEDURE — 25000003 PHARM REV CODE 250: Performed by: NURSE ANESTHETIST, CERTIFIED REGISTERED

## 2024-12-17 PROCEDURE — 25000003 PHARM REV CODE 250: Performed by: STUDENT IN AN ORGANIZED HEALTH CARE EDUCATION/TRAINING PROGRAM

## 2024-12-17 PROCEDURE — C9290 INJ, BUPIVACAINE LIPOSOME: HCPCS | Performed by: STUDENT IN AN ORGANIZED HEALTH CARE EDUCATION/TRAINING PROGRAM

## 2024-12-17 PROCEDURE — 63600175 PHARM REV CODE 636 W HCPCS: Performed by: NURSE ANESTHETIST, CERTIFIED REGISTERED

## 2024-12-17 PROCEDURE — 49650 LAP ING HERNIA REPAIR INIT: CPT | Mod: LT,,, | Performed by: STUDENT IN AN ORGANIZED HEALTH CARE EDUCATION/TRAINING PROGRAM

## 2024-12-17 PROCEDURE — 27201423 OPTIME MED/SURG SUP & DEVICES STERILE SUPPLY: Performed by: STUDENT IN AN ORGANIZED HEALTH CARE EDUCATION/TRAINING PROGRAM

## 2024-12-17 DEVICE — MESH PROGRIP LAP 12X16CM LEFT: Type: IMPLANTABLE DEVICE | Site: ABDOMEN | Status: FUNCTIONAL

## 2024-12-17 RX ORDER — GLUCAGON 1 MG
1 KIT INJECTION
Status: DISCONTINUED | OUTPATIENT
Start: 2024-12-17 | End: 2024-12-17 | Stop reason: HOSPADM

## 2024-12-17 RX ORDER — AMOXICILLIN 250 MG
1 CAPSULE ORAL 2 TIMES DAILY
COMMUNITY
Start: 2024-12-17

## 2024-12-17 RX ORDER — BUPIVACAINE HYDROCHLORIDE 5 MG/ML
INJECTION, SOLUTION PERINEURAL
Status: DISCONTINUED | OUTPATIENT
Start: 2024-12-17 | End: 2024-12-17 | Stop reason: HOSPADM

## 2024-12-17 RX ORDER — BUPIVACAINE 13.3 MG/ML
INJECTION, SUSPENSION, LIPOSOMAL INFILTRATION
Status: DISCONTINUED | OUTPATIENT
Start: 2024-12-17 | End: 2024-12-17 | Stop reason: HOSPADM

## 2024-12-17 RX ORDER — SODIUM CHLORIDE 0.9 % (FLUSH) 0.9 %
10 SYRINGE (ML) INJECTION
Status: DISCONTINUED | OUTPATIENT
Start: 2024-12-17 | End: 2024-12-17 | Stop reason: HOSPADM

## 2024-12-17 RX ORDER — HYDROMORPHONE HYDROCHLORIDE 2 MG/ML
0.2 INJECTION, SOLUTION INTRAMUSCULAR; INTRAVENOUS; SUBCUTANEOUS EVERY 5 MIN PRN
Status: DISCONTINUED | OUTPATIENT
Start: 2024-12-17 | End: 2024-12-17 | Stop reason: HOSPADM

## 2024-12-17 RX ORDER — LIDOCAINE HYDROCHLORIDE 20 MG/ML
INJECTION INTRAVENOUS
Status: DISCONTINUED | OUTPATIENT
Start: 2024-12-17 | End: 2024-12-17

## 2024-12-17 RX ORDER — CEFAZOLIN 2 G/1
2 INJECTION, POWDER, FOR SOLUTION INTRAMUSCULAR; INTRAVENOUS
Status: COMPLETED | OUTPATIENT
Start: 2024-12-17 | End: 2024-12-17

## 2024-12-17 RX ORDER — OXYCODONE HYDROCHLORIDE 5 MG/1
5 TABLET ORAL
Status: DISCONTINUED | OUTPATIENT
Start: 2024-12-17 | End: 2024-12-17 | Stop reason: HOSPADM

## 2024-12-17 RX ORDER — HYDROCODONE BITARTRATE AND ACETAMINOPHEN 5; 325 MG/1; MG/1
1 TABLET ORAL EVERY 4 HOURS PRN
Status: DISCONTINUED | OUTPATIENT
Start: 2024-12-17 | End: 2024-12-17 | Stop reason: HOSPADM

## 2024-12-17 RX ORDER — ONDANSETRON HYDROCHLORIDE 2 MG/ML
4 INJECTION, SOLUTION INTRAVENOUS DAILY PRN
Status: DISCONTINUED | OUTPATIENT
Start: 2024-12-17 | End: 2024-12-17 | Stop reason: HOSPADM

## 2024-12-17 RX ORDER — ROCURONIUM BROMIDE 10 MG/ML
INJECTION, SOLUTION INTRAVENOUS
Status: DISCONTINUED | OUTPATIENT
Start: 2024-12-17 | End: 2024-12-17

## 2024-12-17 RX ORDER — HYDROCODONE BITARTRATE AND ACETAMINOPHEN 5; 325 MG/1; MG/1
1 TABLET ORAL EVERY 4 HOURS PRN
Qty: 10 TABLET | Refills: 0 | Status: SHIPPED | OUTPATIENT
Start: 2024-12-17

## 2024-12-17 RX ORDER — ACETAMINOPHEN 10 MG/ML
INJECTION, SOLUTION INTRAVENOUS
Status: DISCONTINUED | OUTPATIENT
Start: 2024-12-17 | End: 2024-12-17

## 2024-12-17 RX ORDER — PHENYLEPHRINE HYDROCHLORIDE 10 MG/ML
INJECTION INTRAVENOUS
Status: DISCONTINUED | OUTPATIENT
Start: 2024-12-17 | End: 2024-12-17

## 2024-12-17 RX ORDER — PROPOFOL 10 MG/ML
VIAL (ML) INTRAVENOUS
Status: DISCONTINUED | OUTPATIENT
Start: 2024-12-17 | End: 2024-12-17

## 2024-12-17 RX ORDER — FENTANYL CITRATE 50 UG/ML
INJECTION, SOLUTION INTRAMUSCULAR; INTRAVENOUS
Status: DISCONTINUED | OUTPATIENT
Start: 2024-12-17 | End: 2024-12-17

## 2024-12-17 RX ORDER — DEXAMETHASONE SODIUM PHOSPHATE 4 MG/ML
INJECTION, SOLUTION INTRA-ARTICULAR; INTRALESIONAL; INTRAMUSCULAR; INTRAVENOUS; SOFT TISSUE
Status: DISCONTINUED | OUTPATIENT
Start: 2024-12-17 | End: 2024-12-17

## 2024-12-17 RX ORDER — LIDOCAINE HYDROCHLORIDE 10 MG/ML
1 INJECTION, SOLUTION EPIDURAL; INFILTRATION; INTRACAUDAL; PERINEURAL ONCE
Status: DISCONTINUED | OUTPATIENT
Start: 2024-12-17 | End: 2024-12-17 | Stop reason: HOSPADM

## 2024-12-17 RX ADMIN — SUGAMMADEX 200 MG: 100 INJECTION, SOLUTION INTRAVENOUS at 12:12

## 2024-12-17 RX ADMIN — HYDROMORPHONE HYDROCHLORIDE 0.2 MG: 2 INJECTION, SOLUTION INTRAMUSCULAR; INTRAVENOUS; SUBCUTANEOUS at 01:12

## 2024-12-17 RX ADMIN — CEFAZOLIN 2 G: 2 INJECTION, POWDER, FOR SOLUTION INTRAMUSCULAR; INTRAVENOUS at 10:12

## 2024-12-17 RX ADMIN — ROCURONIUM BROMIDE 10 MG: 10 INJECTION, SOLUTION INTRAVENOUS at 11:12

## 2024-12-17 RX ADMIN — ACETAMINOPHEN 1000 MG: 10 INJECTION, SOLUTION INTRAVENOUS at 11:12

## 2024-12-17 RX ADMIN — ROCURONIUM BROMIDE 50 MG: 10 INJECTION, SOLUTION INTRAVENOUS at 10:12

## 2024-12-17 RX ADMIN — LIDOCAINE HYDROCHLORIDE 80 MG: 20 INJECTION, SOLUTION INTRAVENOUS at 10:12

## 2024-12-17 RX ADMIN — SODIUM CHLORIDE, SODIUM LACTATE, POTASSIUM CHLORIDE, AND CALCIUM CHLORIDE: .6; .31; .03; .02 INJECTION, SOLUTION INTRAVENOUS at 10:12

## 2024-12-17 RX ADMIN — PHENYLEPHRINE HYDROCHLORIDE 200 MCG: 10 INJECTION INTRAVENOUS at 10:12

## 2024-12-17 RX ADMIN — DEXAMETHASONE SODIUM PHOSPHATE 8 MG: 4 INJECTION, SOLUTION INTRA-ARTICULAR; INTRALESIONAL; INTRAMUSCULAR; INTRAVENOUS; SOFT TISSUE at 10:12

## 2024-12-17 RX ADMIN — PROPOFOL 200 MG: 10 INJECTION, EMULSION INTRAVENOUS at 10:12

## 2024-12-17 RX ADMIN — ONDANSETRON 4 MG: 2 INJECTION, SOLUTION INTRAMUSCULAR; INTRAVENOUS at 12:12

## 2024-12-17 RX ADMIN — FENTANYL CITRATE 100 MCG: 50 INJECTION INTRAMUSCULAR; INTRAVENOUS at 10:12

## 2024-12-17 RX ADMIN — PHENYLEPHRINE HYDROCHLORIDE 100 MCG: 10 INJECTION INTRAVENOUS at 10:12

## 2024-12-17 RX ADMIN — OXYCODONE 5 MG: 5 TABLET ORAL at 12:12

## 2024-12-17 RX ADMIN — ROCURONIUM BROMIDE 20 MG: 10 INJECTION, SOLUTION INTRAVENOUS at 10:12

## 2024-12-17 NOTE — TRANSFER OF CARE
Anesthesia Transfer of Care Note    Patient: Pratik Laird    Procedure(s) Performed: Procedure(s) (LRB):  ROBOTIC REPAIR, HERNIA, INGUINAL (Left)    Patient location: PACU    Anesthesia Type: general    Transport from OR: Transported from OR on 6-10 L/min O2 by face mask with adequate spontaneous ventilation    Post pain: adequate analgesia    Post assessment: no apparent anesthetic complications and tolerated procedure well    Post vital signs: stable    Level of consciousness: awake and alert    Nausea/Vomiting: no nausea/vomiting    Complications: none    Transfer of care protocol was followed    Last vitals: Visit Vitals  /65 (BP Location: Right arm, Patient Position: Lying)   Pulse 66   Temp 36.9 °C (98.4 °F) (Skin)   Resp 16   SpO2 98%

## 2024-12-17 NOTE — OP NOTE
DATE OF PROCEDURE: 12/17/2024       PREOPERATIVE DIAGNOSIS: Left inguinal hernia.     POSTOPERATIVE DIAGNOSIS: Left indirect inguinal hernia.     PROCEDURE: Robotic repair of left inguinal hernia with Progrip mesh.     SURGEON: Nicholas Harris M.D.     ASSISTANT: Salma Merino PGY4     ANESTHESIA: General endotracheal     PREP: Chlorhexidine.     ESTIMATED BLOOD LOSS: Minimal.     INDICATIONS: The patient is a 69 y.o.  male  , who presented to the clinic   with symptomatic left inguinal hernia. The patient was counseled on his surgical options for   treatment and desired laparoscopic repair of inguinal hernia. The   risks of the procedure were described to the patient including bleeding,   infection, pain, scarring, wound complications, injury to local structures such   as the vas deferens or testicular vessels resulting in infertility or testicular  ischemia or necrosis, recurrence, potential need for further surgery. The   patient demonstrated the understanding of these risks and a consent form was   Obtained.     PROCEDURE IN DETAIL: The patient was identified in the Preoperative Unit and   taken back to the Operating Room, laid supine on the operating room table. IV   antibiotics were administered prior to the induction of general anesthesia.   General anesthesia was induced without complication. The patient was then   prepped and draped in the standard sterile fashion. A timeout procedure was   performed in accordance with the hospital protocol. A 8mm incision was made in  The left periumbilical location with a #15-blade scalpel.    Two 8mm trocars were inserted under direct vision laterally on each side.   No right sided inguinal hernia was noted.  Exparel was injected around the left ASIS.   The colon was noted within the left inguinal hernia. It was easily reduced.  There was also a small nodule of abnormal appearing omentum adhered to the peritoneum just superior to the indirect defect. This was excised and  sent for specimen. No other abnormalities were noted.   The peritoneum over the LLQ was incised sharply. The preperitoneal space was dissected and the hernia sac was indentified.  There was a hernia in the indirect space. This was reduced after a great deal of dissection. The remainder of the iliopubic tract was cleaned off laterally. No femoral or direct hernia sac was noted. The lipoma was resected and the sac reduced into the retroperitoneum.  The Progip mesh was then unrolled superiorly to inferiorly. The mesh was flattened out and found to lay smoothly across the area for direct, indirect and femoral hernia.  Once this was achieved and hemostasis was confirmed, the   peritoneum was reapproximated using 2-0 vloc suture.  At the conclusion, we inspected the pelvis and   hemostasis was achieved. The peritoneum was greatly approximated with no mesh   exposed.      At this point, the ports were removed under   direct visualization and CO2 gas was evacuated.   The skin incisions were   closed using 5-0 Monocryl suture in an interrupted fashion.  Dry sterile   dressings were applied.The patient was awakened from general anesthesia without   complication and returned to the Postoperative Recovery Unit in a stable   condition. At the end of the case, sponge, instrument and needle counts were   correct on 2 occasions. I was present and scrubbed throughout the entirety of   the case.     COMPLICATIONS: None.     CONDITION: Stable.

## 2024-12-17 NOTE — DISCHARGE SUMMARY
[facility] - Surgery (Hospital)  Discharge Note  Short Stay    Procedure(s) (LRB):  ROBOTIC REPAIR, HERNIA, INGUINAL (Left)    OUTCOME: Patient tolerated treatment/procedure well without complication and is now ready for discharge.    DISPOSITION: Home or Self Care    FINAL DIAGNOSIS:  Left inguinal hernia    FOLLOWUP: In clinic    DISCHARGE INSTRUCTIONS:    Discharge Procedure Orders   Diet general     Call MD for:  temperature >100.4     Call MD for:  persistent nausea and vomiting     Call MD for:  severe uncontrolled pain     No dressing needed     Notify your health care provider if you experience any of the following:  increased confusion or weakness     Notify your health care provider if you experience any of the following:  persistent dizziness, light-headedness, or visual disturbances     Notify your health care provider if you experience any of the following:  worsening rash     Notify your health care provider if you experience any of the following:  severe persistent headache     Notify your health care provider if you experience any of the following:  difficulty breathing or increased cough     Notify your health care provider if you experience any of the following:  redness, tenderness, or signs of infection (pain, swelling, redness, odor or green/yellow discharge around incision site)     Notify your health care provider if you experience any of the following:  severe uncontrolled pain     Notify your health care provider if you experience any of the following:  persistent nausea and vomiting or diarrhea     Notify your health care provider if you experience any of the following:  temperature >100.4     Weight bearing restrictions (specify):   Order Comments: No lifting more than 15lbs for 4 weeks        TIME SPENT ON DISCHARGE: 15 minutes    Heron Yancey MD  General Surgery PGY-2

## 2024-12-18 NOTE — ANESTHESIA POSTPROCEDURE EVALUATION
Anesthesia Post Evaluation    Patient: Pratik Laird    Procedure(s) Performed: Procedure(s) (LRB):  ROBOTIC REPAIR, HERNIA, INGUINAL (Left)    Final Anesthesia Type: general      Patient location during evaluation: PACU  Patient participation: Yes- Able to Participate  Level of consciousness: awake and alert, oriented and awake  Post-procedure vital signs: reviewed and stable  Pain management: adequate  Airway patency: patent  TINA mitigation strategies: Multimodal analgesia, Extubation while patient is awake and Verification of full reversal of neuromuscular block  PONV status at discharge: No PONV  Anesthetic complications: no      Cardiovascular status: hemodynamically stable  Respiratory status: spontaneous ventilation and room air  Hydration status: euvolemic  Follow-up not needed.              Vitals Value Taken Time   /63 12/17/24 1610   Temp 36.7 °C (98 °F) 12/17/24 1610   Pulse 59 12/17/24 1610   Resp 18 12/17/24 1610   SpO2 97 % 12/17/24 1610         Event Time   Out of Recovery 13:48:00         Pain/Deepak Score: Pain Rating Prior to Med Admin: 6 (12/17/2024  1:20 PM)  Deepak Score: 10 (12/17/2024  4:38 PM)

## 2024-12-30 ENCOUNTER — OFFICE VISIT (OUTPATIENT)
Dept: SURGERY | Facility: CLINIC | Age: 69
End: 2024-12-30
Payer: MEDICARE

## 2024-12-30 VITALS
TEMPERATURE: 98 F | BODY MASS INDEX: 34.94 KG/M2 | HEART RATE: 62 BPM | HEIGHT: 69 IN | WEIGHT: 235.88 LBS | DIASTOLIC BLOOD PRESSURE: 74 MMHG | SYSTOLIC BLOOD PRESSURE: 117 MMHG

## 2024-12-30 DIAGNOSIS — Z98.890 S/P HERNIA REPAIR: Primary | ICD-10-CM

## 2024-12-30 DIAGNOSIS — Z87.19 S/P HERNIA REPAIR: Primary | ICD-10-CM

## 2024-12-30 PROCEDURE — 99999 PR PBB SHADOW E&M-EST. PATIENT-LVL III: CPT | Mod: PBBFAC,,, | Performed by: STUDENT IN AN ORGANIZED HEALTH CARE EDUCATION/TRAINING PROGRAM

## 2024-12-30 NOTE — LETTER
December 30, 2024    Pratik Laird  [address] [id number]         [facility]  [address]  [facility] [address]  [facility] [address]  Phone: [phone] December 30, 2024     Patient: Pratik Laird   YOB: 1955   Date of Visit: 12/30/2024       To Whom It May Concern:    Pratik Laird may return to full duty work on 01/28/2025 with no restrictions.    If you have any questions or concerns, please don't hesitate to call.    Sincerely,        Nicholas Harris MD

## 2024-12-30 NOTE — LETTER
December 30, 2024    Pratik Laird  [address] [id number]         [facility]  [address]  [facility] [address]  [facility] [address]  Phone: [phone] December 30, 2024     Patient: Pratik Laird   YOB: 1955   Date of Visit: 12/30/2024       To Whom It May Concern:    Pratik Laird may return to full duty work on 01/28/2025 with no restrictions.    If you have any questions or concerns, please don't hesitate to call.    Sincerely,        Nicholas Harris MD

## 2024-12-30 NOTE — Clinical Note
December 30, 2024      [facility]  [address]  [address]  [facility] [address]  Phone: [phone]       Patient: Pratik Laird   YOB: 1955  Date of Visit: 12/30/2024    To Whom It May Concern:    Jennifer Laird  was at [facility] on 12/30/2024. The patient may return to work/school on *** {With/no:39098} restrictions. If you have any questions or concerns, or if I can be of further assistance, please do not hesitate to contact me.    Sincerely,    Lilliam Julian MA

## 2024-12-30 NOTE — PROGRESS NOTES
S/p robot [facility]  Feeling well  No pain  Incisions good  Ambulating fine  Feels better than before surgery  Gradually increase activity  Ok to resume duties at [employer] jan 27, no restrictions

## 2025-01-17 RX ORDER — FAMOTIDINE 20 MG/1
20 TABLET, FILM COATED ORAL 2 TIMES DAILY
Qty: 180 TABLET | Refills: 3 | Status: SHIPPED | OUTPATIENT
Start: 2025-01-17

## 2025-02-20 ENCOUNTER — OFFICE VISIT (OUTPATIENT)
Dept: OTOLARYNGOLOGY | Facility: CLINIC | Age: 70
End: 2025-02-20
Payer: MEDICARE

## 2025-02-20 VITALS
SYSTOLIC BLOOD PRESSURE: 148 MMHG | BODY MASS INDEX: 35.37 KG/M2 | HEART RATE: 68 BPM | DIASTOLIC BLOOD PRESSURE: 65 MMHG | OXYGEN SATURATION: 98 % | WEIGHT: 239.5 LBS

## 2025-02-20 DIAGNOSIS — J30.9 ALLERGIC RHINITIS, UNSPECIFIED SEASONALITY, UNSPECIFIED TRIGGER: ICD-10-CM

## 2025-02-20 DIAGNOSIS — H90.3 SENSORINEURAL HEARING LOSS (SNHL) OF BOTH EARS: ICD-10-CM

## 2025-02-20 DIAGNOSIS — R09.89 PHLEGM IN THROAT: ICD-10-CM

## 2025-02-20 DIAGNOSIS — J34.2 NASAL SEPTAL DEVIATION: ICD-10-CM

## 2025-02-20 DIAGNOSIS — Z97.4 HEARING AID WORN: ICD-10-CM

## 2025-02-20 DIAGNOSIS — R09.89 THROAT CLEARING: ICD-10-CM

## 2025-02-20 DIAGNOSIS — K21.9 LARYNGOPHARYNGEAL REFLUX (LPR): Primary | ICD-10-CM

## 2025-02-20 NOTE — PROGRESS NOTES
Subjective:       Patient ID: Pratik Laird is a 69 y.o. male.    Chief Complaint: No chief complaint on file.    The patient is returning for a 3 month follow-up visit.  There are multiple issues to discuss:  1. Laryngopharyngeal reflux::  The patient is having minimal throat symptoms.  It does occasionally have phlegm in the throat or throat clearing.  He is taking famotidine twice daily.  He is trying to follow the anti-reflux diet.    2. Allergic rhinitis:  Nasal secretions are clear the patient is breathing well through his nose currently.  There is minimal postnasal drip.  He is using loratadine and ipratropium bromide nasal sprays, which generally control symptoms  3. Sensorineural hearing loss/wears hearing aids:  He wears his hearing aids pretty much all day everyday in both ears.         Review of Systems     Constitutional: Positive for fatigue.  Negative for appetite change, chills, fever and unexpected weight loss.      HENT: Positive for hearing loss (wears hearing aids bilaterally), postnasal drip, runny nose, sinus pressure, sore throat, stuffy nose and trouble swallowing.  Negative for ear discharge, ear infection, ear pain, facial swelling, mouth sores, nosebleeds, ringing in the ears, sinus infection, tonsil infection, dental problems and voice change.      Eyes:  Negative for change in eyesight, eye drainage, eye itching and photophobia.     Respiratory:  Positive for cough. Negative for shortness of breath, sleep apnea, snoring and wheezing.      Cardiovascular:  Negative for chest pain, foot swelling, irregular heartbeat and swollen veins.     Gastrointestinal:  Negative for abdominal pain, acid reflux, constipation, diarrhea, heartburn and vomiting.     Genitourinary: Negative for difficulty urinating, sexual problems and frequent urination.     Musc: Negative for aching joints, aching muscles, back pain and neck pain.     Skin: Negative for rash.     Allergy: Positive for seasonal  allergies. Negative for food allergies.     Endocrine: Negative for cold intolerance and heat intolerance.      Neurological: Positive for headaches. Negative for dizziness, light-headedness, seizures and tremors.      Hematologic: Negative for bruises/bleeds easily and swollen glands.      Psychiatric: Negative for decreased concentration, depression, nervous/anxious and sleep disturbance.              Objective:      Physical Exam  Vitals and nursing note reviewed.   Constitutional:       General: He is awake.      Appearance: Normal appearance. He is well-developed, well-groomed and overweight.   HENT:      Head: Normocephalic.      Jaw: There is normal jaw occlusion.      Salivary Glands: Right salivary gland is not diffusely enlarged or tender. Left salivary gland is not diffusely enlarged or tender.      Right Ear: Ear canal and external ear normal. Decreased hearing noted. There is impacted cerumen. Tympanic membrane is retracted.      Left Ear: Ear canal and external ear normal. Decreased hearing noted. There is impacted cerumen. Tympanic membrane is retracted.      Nose: Septal deviation (to the left), mucosal edema (cyanotic, boggy inferior turbinates bilaterally) and rhinorrhea (clear mucus bilaterally) present. No nasal deformity. Rhinorrhea is clear.      Right Turbinates: Enlarged and pale.      Left Turbinates: Enlarged and pale.      Mouth/Throat:      Lips: No lesions.      Mouth: Mucous membranes are moist. No oral lesions.      Dentition: No gum lesions.      Tongue: No lesions.      Palate: No mass and lesions.      Pharynx: Oropharynx is clear. Uvula midline.      Tonsils: 3+ on the right. 3+ on the left.   Eyes:      General: Lids are normal.         Right eye: No discharge.         Left eye: No discharge.      Conjunctiva/sclera:      Right eye: Right conjunctiva is injected. No exudate.     Left eye: Left conjunctiva is injected. No exudate.     Pupils: Pupils are equal, round, and reactive  to light.   Neck:      Thyroid: No thyroid mass or thyromegaly.      Trachea: Trachea normal. No tracheal deviation.   Cardiovascular:      Rate and Rhythm: Normal rate and regular rhythm.      Pulses: Normal pulses.      Heart sounds: Normal heart sounds.   Pulmonary:      Effort: Pulmonary effort is normal.      Breath sounds: Normal breath sounds. No stridor. No decreased breath sounds, wheezing, rhonchi or rales.   Abdominal:      General: Bowel sounds are normal.      Palpations: Abdomen is soft.      Tenderness: There is no abdominal tenderness.   Musculoskeletal:      Cervical back: Neck supple. No muscular tenderness. Decreased range of motion.   Lymphadenopathy:      Head:      Right side of head: No submental, submandibular, preauricular, posterior auricular or occipital adenopathy.      Left side of head: No submental, submandibular, preauricular, posterior auricular or occipital adenopathy.      Cervical: No cervical adenopathy.   Skin:     General: Skin is warm and dry.      Findings: No petechiae or rash.      Nails: There is no clubbing.   Neurological:      Mental Status: He is alert and oriented to person, place, and time.      Cranial Nerves: No cranial nerve deficit.      Sensory: No sensory deficit.      Gait: Gait normal.   Psychiatric:         Speech: Speech normal.         Behavior: Behavior normal. Behavior is cooperative.         Thought Content: Thought content normal.         Judgment: Judgment normal.            Assessment:       No diagnosis found.          Plan:       I will continue the patient's current medications.  I will recheck him in 3 months.  He will need undergo a flexible nasolaryngoscopy at that visit.                  DISCLAIMER: This note was prepared with [employer] voice recognition transcription software. Garbled syntax, mangled pronouns, and other bizarre constructions may be attributed to that software system. While efforts were made to correct any mistakes made by this  voice recognition program, some errors and/or omissions may remain in the note that were missed when the note was originally created.

## 2025-05-19 NOTE — PROGRESS NOTES
Subjective:       Patient ID: Pratik Laird is a 70 y.o. male.    Chief Complaint: Follow-up    The patient is returning for a 3 month follow-up visit.  There are multiple issues to discuss:  1. Laryngopharyngeal reflux: He is taking famotidine twice daily.  He is trying to follow the anti-reflux diet.  He has minimal throat clearing and phlegm in the throat and no dysphagia or choking.  Most of his symptoms only occur after laying down at night.    2. Allergic rhinitis:  Nasal secretions are clear.  The patient is currently not having significant nasal obstruction problems.  He is using loratadine and ipratropium bromide nasal sprays, which generally control symptoms  3. Sensorineural hearing loss/wears hearing aids:  He wears his hearing aids pretty much all day everyday in both ears.           Review of Systems     Constitutional: Positive for fatigue.  Negative for appetite change, chills, fever and unexpected weight loss.      HENT: Positive for hearing loss (wears hearing aids bilaterally), postnasal drip, runny nose, sinus pressure, sore throat, stuffy nose and trouble swallowing.  Negative for ear discharge, ear infection, ear pain, facial swelling, mouth sores, nosebleeds, ringing in the ears, sinus infection, tonsil infection, dental problems and voice change.      Eyes:  Negative for change in eyesight, eye drainage, eye itching and photophobia.     Respiratory:  Positive for cough. Negative for shortness of breath, sleep apnea, snoring and wheezing.      Cardiovascular:  Negative for chest pain, foot swelling, irregular heartbeat and swollen veins.     Gastrointestinal:  Negative for abdominal pain, acid reflux, constipation, diarrhea, heartburn and vomiting.     Genitourinary: Negative for difficulty urinating, sexual problems and frequent urination.     Musc: Negative for aching joints, aching muscles, back pain and neck pain.     Skin: Negative for rash.     Allergy: Positive for seasonal  allergies. Negative for food allergies.     Endocrine: Negative for cold intolerance and heat intolerance.      Neurological: Positive for headaches. Negative for dizziness, light-headedness, seizures and tremors.      Hematologic: Negative for bruises/bleeds easily and swollen glands.      Psychiatric: Negative for decreased concentration, depression, nervous/anxious and sleep disturbance.                Objective:      Physical Exam  Vitals and nursing note reviewed.   Constitutional:       General: He is awake.      Appearance: Normal appearance. He is well-developed, well-groomed and overweight.   HENT:      Head: Normocephalic.      Jaw: There is normal jaw occlusion.      Salivary Glands: Right salivary gland is not diffusely enlarged or tender. Left salivary gland is not diffusely enlarged or tender.      Right Ear: Ear canal and external ear normal. Decreased hearing noted. There is impacted cerumen. Tympanic membrane is retracted.      Left Ear: Ear canal and external ear normal. Decreased hearing noted. There is impacted cerumen. Tympanic membrane is retracted.      Nose: Septal deviation (to the left), mucosal edema (cyanotic, boggy inferior turbinates bilaterally) and rhinorrhea (clear mucus bilaterally) present. No nasal deformity. Rhinorrhea is clear.      Right Turbinates: Enlarged and pale.      Left Turbinates: Enlarged and pale.      Mouth/Throat:      Lips: No lesions.      Mouth: Mucous membranes are moist. No oral lesions.      Dentition: No gum lesions.      Tongue: No lesions.      Palate: No mass and lesions.      Pharynx: Oropharynx is clear. Uvula midline.      Tonsils: 3+ on the right. 3+ on the left.   Eyes:      General: Lids are normal.         Right eye: No discharge.         Left eye: No discharge.      Conjunctiva/sclera:      Right eye: Right conjunctiva is injected. No exudate.     Left eye: Left conjunctiva is injected. No exudate.     Pupils: Pupils are equal, round, and reactive  to light.   Neck:      Thyroid: No thyroid mass or thyromegaly.      Trachea: Trachea normal. No tracheal deviation.   Cardiovascular:      Rate and Rhythm: Normal rate and regular rhythm.      Pulses: Normal pulses.      Heart sounds: Normal heart sounds.   Pulmonary:      Effort: Pulmonary effort is normal.      Breath sounds: Normal breath sounds. No stridor. No decreased breath sounds, wheezing, rhonchi or rales.   Abdominal:      General: Bowel sounds are normal.      Palpations: Abdomen is soft.      Tenderness: There is no abdominal tenderness.   Musculoskeletal:      Cervical back: Neck supple. No muscular tenderness. Decreased range of motion.   Lymphadenopathy:      Head:      Right side of head: No submental, submandibular, preauricular, posterior auricular or occipital adenopathy.      Left side of head: No submental, submandibular, preauricular, posterior auricular or occipital adenopathy.      Cervical: No cervical adenopathy.   Skin:     General: Skin is warm and dry.      Findings: No petechiae or rash.      Nails: There is no clubbing.   Neurological:      Mental Status: He is alert and oriented to person, place, and time.      Cranial Nerves: No cranial nerve deficit.      Sensory: No sensory deficit.      Gait: Gait normal.   Psychiatric:         Speech: Speech normal.         Behavior: Behavior normal. Behavior is cooperative.         Thought Content: Thought content normal.         Judgment: Judgment normal.     Flexible video nasolaryngoscopy-nasal septal deviation and nasal changes of allergic rhinitis, laryngeal changes consistent with laryngal pharyngeal reflux that continues to improve on H2 agonist therapy    Laryngeal pictures:                Torus tubarius/nasopharyngeal pictures:              Left nasal passage pictures:          Right nasal passage pictures:               Assessment:       1. Phlegm in throat    2. Laryngopharyngeal reflux (LPR)    3. Allergic rhinitis, unspecified  seasonality, unspecified trigger    4. Nasal septal deviation    5. Sensorineural hearing loss (SNHL) of both ears    6. Hearing aid worn    7. PND (post-nasal drip)              Plan:       I  will have the patient try using famotidine every night but delete the daytime dose unless he is having symptoms or nose he is going to be varying from the anti-reflux diet.  If throat symptoms worsened with this regimen he will resume twice daily dosing on his famotidine.  I will recheck him in 3 months.                  DISCLAIMER: This note was prepared with [employer] voice recognition transcription software. Garbled syntax, mangled pronouns, and other bizarre constructions may be attributed to that software system. While efforts were made to correct any mistakes made by this voice recognition program, some errors and/or omissions may remain in the note that were missed when the note was originally created.

## 2025-05-20 ENCOUNTER — OFFICE VISIT (OUTPATIENT)
Dept: OTOLARYNGOLOGY | Facility: CLINIC | Age: 70
End: 2025-05-20
Payer: MEDICARE

## 2025-05-20 VITALS
WEIGHT: 240.5 LBS | DIASTOLIC BLOOD PRESSURE: 80 MMHG | BODY MASS INDEX: 35.52 KG/M2 | SYSTOLIC BLOOD PRESSURE: 112 MMHG | OXYGEN SATURATION: 98 % | HEART RATE: 66 BPM

## 2025-05-20 DIAGNOSIS — K21.9 LARYNGOPHARYNGEAL REFLUX (LPR): ICD-10-CM

## 2025-05-20 DIAGNOSIS — R09.89 PHLEGM IN THROAT: Primary | ICD-10-CM

## 2025-05-20 DIAGNOSIS — Z97.4 HEARING AID WORN: ICD-10-CM

## 2025-05-20 DIAGNOSIS — R09.82 PND (POST-NASAL DRIP): ICD-10-CM

## 2025-05-20 DIAGNOSIS — J30.9 ALLERGIC RHINITIS, UNSPECIFIED SEASONALITY, UNSPECIFIED TRIGGER: ICD-10-CM

## 2025-05-20 DIAGNOSIS — J34.2 NASAL SEPTAL DEVIATION: ICD-10-CM

## 2025-05-20 DIAGNOSIS — H90.3 SENSORINEURAL HEARING LOSS (SNHL) OF BOTH EARS: ICD-10-CM

## 2025-05-20 PROCEDURE — 4010F ACE/ARB THERAPY RXD/TAKEN: CPT | Mod: CPTII,S$GLB,, | Performed by: SPECIALIST

## 2025-05-20 PROCEDURE — 1126F AMNT PAIN NOTED NONE PRSNT: CPT | Mod: CPTII,S$GLB,, | Performed by: SPECIALIST

## 2025-05-20 PROCEDURE — 1101F PT FALLS ASSESS-DOCD LE1/YR: CPT | Mod: CPTII,S$GLB,, | Performed by: SPECIALIST

## 2025-05-20 PROCEDURE — 1160F RVW MEDS BY RX/DR IN RCRD: CPT | Mod: CPTII,S$GLB,, | Performed by: SPECIALIST

## 2025-05-20 PROCEDURE — 3079F DIAST BP 80-89 MM HG: CPT | Mod: CPTII,S$GLB,, | Performed by: SPECIALIST

## 2025-05-20 PROCEDURE — 31575 DIAGNOSTIC LARYNGOSCOPY: CPT | Mod: S$GLB,,, | Performed by: SPECIALIST

## 2025-05-20 PROCEDURE — 3074F SYST BP LT 130 MM HG: CPT | Mod: CPTII,S$GLB,, | Performed by: SPECIALIST

## 2025-05-20 PROCEDURE — 99999 PR PBB SHADOW E&M-EST. PATIENT-LVL IV: CPT | Mod: PBBFAC,,, | Performed by: SPECIALIST

## 2025-05-20 PROCEDURE — 3288F FALL RISK ASSESSMENT DOCD: CPT | Mod: CPTII,S$GLB,, | Performed by: SPECIALIST

## 2025-05-20 PROCEDURE — 3008F BODY MASS INDEX DOCD: CPT | Mod: CPTII,S$GLB,, | Performed by: SPECIALIST

## 2025-05-20 PROCEDURE — 1159F MED LIST DOCD IN RCRD: CPT | Mod: CPTII,S$GLB,, | Performed by: SPECIALIST

## 2025-05-20 PROCEDURE — 99214 OFFICE O/P EST MOD 30 MIN: CPT | Mod: 25,S$GLB,, | Performed by: SPECIALIST

## 2025-05-21 NOTE — PROCEDURES
"Laryngoscopy    Date/Time: 5/20/2025 3:00 PM    Performed by: BERNARDO Esquivel MD  Authorized by: BERNARDO Esquivel MD    Time out: Immediately prior to procedure a "time out" was called to verify the correct patient, procedure, equipment, support staff and site/side marked as required.    Consent Done?:  Yes (Verbal)  Anesthesia:     Local anesthetic:  4% Xylocaine spray with Benji-Synephrine    Patient tolerance:  Patient tolerated the procedure well with no immediate complications    Decongestion performed?: Yes    Laryngoscopy:     Areas examined:  Larynx, hypopharynx, oropharynx, nasopharynx, nasal cavities and vocal cords    Laryngoscope size:  4 mm (Flexible video nasolaryngoscopy)  Nose External:      No external nasal deformity  Nose Intranasal:      Mucosa no polyps     Mucosa ulcers not present     No mucosa lesions present (Profuse clear mucus in the nasal passages bilaterally)     Septum gross deformity (nasal septum deviated high to the right and on the flothe nose to the left)     Enlarged turbinates  Nasopharynx:      No mucosa lesions     Adenoids not present     Posterior choanae patent     Eustachian tube patent  Larynx/hypopharynx:      No epiglottis lesions     No epiglottis edema     No AE folds lesions     No vocal cord polyps     Equal and normal bilateral (vocal cords move symmetrically, no mucosal lesions)     No hypopharynx lesions     No piriform sinus pooling     No piriform sinus lesions     Post cricoid edema (trace, improved from last endoscopy)     Post cricoid erythema (trace, improved from last endoscopy)     Flexible video nasolaryngoscopy-nasal septal deviation and nasal changes of allergic rhinitis; laryngeal changes consistent with laryngal pharyngeal reflux that continues to improve with b.i.d. H2 agonist therapy    "

## 2025-08-21 ENCOUNTER — OFFICE VISIT (OUTPATIENT)
Dept: OTOLARYNGOLOGY | Facility: CLINIC | Age: 70
End: 2025-08-21
Payer: MEDICARE

## 2025-08-21 VITALS
HEART RATE: 80 BPM | DIASTOLIC BLOOD PRESSURE: 56 MMHG | SYSTOLIC BLOOD PRESSURE: 103 MMHG | BODY MASS INDEX: 35.91 KG/M2 | WEIGHT: 243.19 LBS

## 2025-08-21 DIAGNOSIS — R13.10 DYSPHAGIA, UNSPECIFIED TYPE: ICD-10-CM

## 2025-08-21 DIAGNOSIS — J30.9 ALLERGIC RHINITIS, UNSPECIFIED SEASONALITY, UNSPECIFIED TRIGGER: ICD-10-CM

## 2025-08-21 DIAGNOSIS — R09.82 PND (POST-NASAL DRIP): ICD-10-CM

## 2025-08-21 DIAGNOSIS — H90.3 SENSORINEURAL HEARING LOSS (SNHL) OF BOTH EARS: ICD-10-CM

## 2025-08-21 DIAGNOSIS — H61.23 BILATERAL IMPACTED CERUMEN: ICD-10-CM

## 2025-08-21 DIAGNOSIS — R09.89 PHLEGM IN THROAT: ICD-10-CM

## 2025-08-21 DIAGNOSIS — R09.89 THROAT CLEARING: ICD-10-CM

## 2025-08-21 DIAGNOSIS — K21.9 LARYNGOPHARYNGEAL REFLUX (LPR): Primary | ICD-10-CM

## 2025-08-21 DIAGNOSIS — Z97.4 HEARING AID WORN: ICD-10-CM

## 2025-08-21 PROCEDURE — 99999 PR PBB SHADOW E&M-EST. PATIENT-LVL III: CPT | Mod: PBBFAC,,, | Performed by: SPECIALIST

## 2025-08-21 RX ORDER — IPRATROPIUM BROMIDE 21 UG/1
2 SPRAY, METERED NASAL 2 TIMES DAILY
Qty: 90 ML | Refills: 3 | Status: SHIPPED | OUTPATIENT
Start: 2025-08-21

## (undated) DEVICE — PAD PINK TRENDELENBURG POS XL

## (undated) DEVICE — TUBING INSUFFLATION W/LUER LOK

## (undated) DEVICE — SUT MCRYL PLUS 4-0 PS2 27IN

## (undated) DEVICE — SEAL UNIVERSAL 5MM-8MM XI

## (undated) DEVICE — NDL HYPO REG 25G X 1 1/2

## (undated) DEVICE — Device

## (undated) DEVICE — COVER TIP CURVED SCISSORS XI

## (undated) DEVICE — BAG TISSUE RETRIEVAL 5MM

## (undated) DEVICE — COVER MAYO STND XL 30X57IN

## (undated) DEVICE — DRAPE ARM DAVINCI XI

## (undated) DEVICE — ELECTRODE REM PLYHSV RETURN 9

## (undated) DEVICE — SUT VLOC 90 3-0 V-20 NDL 9

## (undated) DEVICE — OBTURATOR BLADELESS 8MM XI CLR

## (undated) DEVICE — ADHESIVE DERMABOND ADVANCED

## (undated) DEVICE — GLOVE SURGICAL LATEX SZ 7

## (undated) DEVICE — DRAPE TOP 53X102IN